# Patient Record
Sex: FEMALE | Race: WHITE | NOT HISPANIC OR LATINO | Employment: FULL TIME | RURAL
[De-identification: names, ages, dates, MRNs, and addresses within clinical notes are randomized per-mention and may not be internally consistent; named-entity substitution may affect disease eponyms.]

---

## 2017-11-07 ENCOUNTER — HISTORICAL (OUTPATIENT)
Dept: ADMINISTRATIVE | Facility: HOSPITAL | Age: 27
End: 2017-11-07

## 2017-11-10 LAB
LAB AP COMMENTS: NORMAL
LAB AP GENERAL CAT - HISTORICAL: NORMAL
LAB AP INTERPRETATION/RESULT - HISTORICAL: NEGATIVE
LAB AP SPECIMEN ADEQUACY - HISTORICAL: NORMAL
LAB AP SPECIMEN SUBMITTED - HISTORICAL: NORMAL

## 2020-06-01 ENCOUNTER — HISTORICAL (OUTPATIENT)
Dept: ADMINISTRATIVE | Facility: HOSPITAL | Age: 30
End: 2020-06-01

## 2020-06-04 LAB
REPORT: 25
REPORT: NORMAL

## 2020-07-16 ENCOUNTER — HISTORICAL (OUTPATIENT)
Dept: ADMINISTRATIVE | Facility: HOSPITAL | Age: 30
End: 2020-07-16

## 2020-07-17 LAB
25(OH)D3 SERPL-MCNC: 13.3 NG/ML
ALBUMIN SERPL BCP-MCNC: 4.1 G/DL (ref 3.5–5)
ALBUMIN/GLOB SERPL: 1.1 {RATIO}
ALP SERPL-CCNC: 76 U/L (ref 37–98)
ALT SERPL W P-5'-P-CCNC: 54 U/L (ref 13–56)
ANION GAP SERPL CALCULATED.3IONS-SCNC: 9.3 MMOL/L (ref 7–16)
AST SERPL W P-5'-P-CCNC: 35 U/L (ref 15–37)
BASOPHILS # BLD AUTO: 0.04 X10E3/UL (ref 0–0.2)
BASOPHILS NFR BLD AUTO: 0.8 % (ref 0–1)
BILIRUB SERPL-MCNC: 0.4 MG/DL (ref 0–1.2)
BUN SERPL-MCNC: 13 MG/DL (ref 7–18)
BUN/CREAT SERPL: 16
CALCIUM SERPL-MCNC: 9.2 MG/DL (ref 8.5–10.1)
CHLORIDE SERPL-SCNC: 105 MMOL/L (ref 98–107)
CHOLEST SERPL-MCNC: 185 MG/DL
CO2 SERPL-SCNC: 29 MMOL/L (ref 21–32)
CREAT SERPL-MCNC: 0.82 MG/DL (ref 0.5–1.02)
EOSINOPHIL # BLD AUTO: 0.23 X10E3/UL (ref 0–0.5)
EOSINOPHIL NFR BLD AUTO: 4.4 % (ref 1–4)
ERYTHROCYTE [DISTWIDTH] IN BLOOD BY AUTOMATED COUNT: 13 % (ref 11.5–14.5)
EST. AVERAGE GLUCOSE BLD GHB EST-MCNC: 74 MG/DL
GLOBULIN SER-MCNC: 3.6 G/DL (ref 2–4)
GLUCOSE SERPL-MCNC: 92 MG/DL (ref 74–106)
HBA1C MFR BLD HPLC: 4.8 %
HCT VFR BLD AUTO: 49.4 % (ref 38–47)
HDLC SERPL-MCNC: 40 MG/DL
HGB BLD-MCNC: 15.4 G/DL (ref 12–16)
IMM GRANULOCYTES # BLD AUTO: 0 X10E3/UL (ref 0–0.04)
IMM GRANULOCYTES NFR BLD: 0 % (ref 0–0.4)
LDLC SERPL CALC-MCNC: 111 MG/DL
LYMPHOCYTES # BLD AUTO: 1.4 X10E3/UL (ref 1–4.8)
LYMPHOCYTES NFR BLD AUTO: 27 % (ref 27–41)
MCH RBC QN AUTO: 29.6 PG (ref 27–31)
MCHC RBC AUTO-ENTMCNC: 31.2 G/DL (ref 32–36)
MCV RBC AUTO: 95 FL (ref 80–96)
MONOCYTES # BLD AUTO: 0.25 X10E3/UL (ref 0–0.8)
MONOCYTES NFR BLD AUTO: 4.8 % (ref 2–6)
MPC BLD CALC-MCNC: 13.6 FL (ref 9.4–12.4)
NEUTROPHILS # BLD AUTO: 3.27 X10E3/UL (ref 1.8–7.7)
NEUTROPHILS NFR BLD AUTO: 63 % (ref 53–65)
NONHDLC SERPL-MCNC: 145 MG/DL
NRBC # BLD AUTO: 0 X10E3/UL (ref 0–0)
NRBC, AUTO (.00): 0 /100 (ref 0–0)
PLATELET # BLD AUTO: 221 X10E3/UL (ref 150–400)
PLATELET MORPHOLOGY: ABNORMAL
POTASSIUM SERPL-SCNC: 4.3 MMOL/L (ref 3.5–5.1)
PROT SERPL-MCNC: 7.7 G/DL (ref 6.4–8.2)
RBC # BLD AUTO: 5.2 X10E6/UL (ref 4.2–5.4)
RBC MORPH BLD: NORMAL
SODIUM SERPL-SCNC: 139 MMOL/L (ref 136–145)
TRIGL SERPL-MCNC: 170 MG/DL
TSH SERPL DL<=0.005 MIU/L-ACNC: 0.93 UIU/ML (ref 0.36–3.74)
VLDLC SERPL-MCNC: 34 MG/DL
WBC # BLD AUTO: 5.19 X10E3/UL (ref 4.5–11)

## 2020-08-24 ENCOUNTER — HISTORICAL (OUTPATIENT)
Dept: ADMINISTRATIVE | Facility: HOSPITAL | Age: 30
End: 2020-08-24

## 2020-08-24 LAB — SARS-COV+SARS-COV-2 AG RESP QL IA.RAPID: NEGATIVE

## 2020-12-15 ENCOUNTER — HISTORICAL (OUTPATIENT)
Dept: ADMINISTRATIVE | Facility: HOSPITAL | Age: 30
End: 2020-12-15

## 2020-12-15 LAB — SARS-COV+SARS-COV-2 AG RESP QL IA.RAPID: NEGATIVE

## 2021-11-03 ENCOUNTER — OFFICE VISIT (OUTPATIENT)
Dept: FAMILY MEDICINE | Facility: CLINIC | Age: 31
End: 2021-11-03
Payer: COMMERCIAL

## 2021-11-03 VITALS
TEMPERATURE: 98 F | HEART RATE: 85 BPM | OXYGEN SATURATION: 98 % | DIASTOLIC BLOOD PRESSURE: 70 MMHG | RESPIRATION RATE: 20 BRPM | SYSTOLIC BLOOD PRESSURE: 116 MMHG

## 2021-11-03 DIAGNOSIS — R05.9 COUGH: ICD-10-CM

## 2021-11-03 DIAGNOSIS — R50.9 FEVER, UNSPECIFIED FEVER CAUSE: ICD-10-CM

## 2021-11-03 DIAGNOSIS — J02.9 ACUTE PHARYNGITIS, UNSPECIFIED ETIOLOGY: ICD-10-CM

## 2021-11-03 DIAGNOSIS — J06.9 VIRAL UPPER RESPIRATORY TRACT INFECTION WITH COUGH: Primary | ICD-10-CM

## 2021-11-03 LAB
CTP QC/QA: YES
CTP QC/QA: YES
FLUAV AG NPH QL: NEGATIVE
FLUBV AG NPH QL: NEGATIVE
S PYO RRNA THROAT QL PROBE: NEGATIVE
SARS-COV-2 AG RESP QL IA.RAPID: NEGATIVE

## 2021-11-03 PROCEDURE — 87428 SARSCOV & INF VIR A&B AG IA: CPT | Mod: QW,,, | Performed by: NURSE PRACTITIONER

## 2021-11-03 PROCEDURE — 87880 STREP A ASSAY W/OPTIC: CPT | Mod: QW,,, | Performed by: NURSE PRACTITIONER

## 2021-11-03 PROCEDURE — 99212 OFFICE O/P EST SF 10 MIN: CPT | Mod: 25,,, | Performed by: NURSE PRACTITIONER

## 2021-11-03 PROCEDURE — 87880 POCT RAPID STREP A: ICD-10-PCS | Mod: QW,,, | Performed by: NURSE PRACTITIONER

## 2021-11-03 PROCEDURE — 96372 THER/PROPH/DIAG INJ SC/IM: CPT | Mod: ,,, | Performed by: NURSE PRACTITIONER

## 2021-11-03 PROCEDURE — 96372 PR INJECTION,THERAP/PROPH/DIAG2ST, IM OR SUBCUT: ICD-10-PCS | Mod: ,,, | Performed by: NURSE PRACTITIONER

## 2021-11-03 PROCEDURE — 87428 POCT SARS-COV2 (COVID) WITH FLU ANTIGEN: ICD-10-PCS | Mod: QW,,, | Performed by: NURSE PRACTITIONER

## 2021-11-03 PROCEDURE — 99212 PR OFFICE/OUTPT VISIT, EST, LEVL II, 10-19 MIN: ICD-10-PCS | Mod: 25,,, | Performed by: NURSE PRACTITIONER

## 2021-11-03 RX ORDER — DEXAMETHASONE SODIUM PHOSPHATE 4 MG/ML
6 INJECTION, SOLUTION INTRA-ARTICULAR; INTRALESIONAL; INTRAMUSCULAR; INTRAVENOUS; SOFT TISSUE
Status: COMPLETED | OUTPATIENT
Start: 2021-11-03 | End: 2021-11-03

## 2021-11-03 RX ADMIN — DEXAMETHASONE SODIUM PHOSPHATE 6 MG: 4 INJECTION, SOLUTION INTRA-ARTICULAR; INTRALESIONAL; INTRAMUSCULAR; INTRAVENOUS; SOFT TISSUE at 10:11

## 2022-01-21 ENCOUNTER — OFFICE VISIT (OUTPATIENT)
Dept: FAMILY MEDICINE | Facility: CLINIC | Age: 32
End: 2022-01-21
Payer: COMMERCIAL

## 2022-01-21 VITALS
DIASTOLIC BLOOD PRESSURE: 87 MMHG | BODY MASS INDEX: 34.96 KG/M2 | TEMPERATURE: 99 F | OXYGEN SATURATION: 98 % | HEART RATE: 113 BPM | HEIGHT: 62 IN | WEIGHT: 190 LBS | SYSTOLIC BLOOD PRESSURE: 147 MMHG | RESPIRATION RATE: 18 BRPM

## 2022-01-21 DIAGNOSIS — Z20.822 EXPOSURE TO COVID-19 VIRUS: Primary | ICD-10-CM

## 2022-01-21 LAB
CTP QC/QA: YES
SARS-COV-2 AG RESP QL IA.RAPID: NEGATIVE

## 2022-01-21 PROCEDURE — 3079F DIAST BP 80-89 MM HG: CPT | Mod: ,,, | Performed by: NURSE PRACTITIONER

## 2022-01-21 PROCEDURE — 87426 SARSCOV CORONAVIRUS AG IA: CPT | Mod: QW,,, | Performed by: NURSE PRACTITIONER

## 2022-01-21 PROCEDURE — 3008F BODY MASS INDEX DOCD: CPT | Mod: ,,, | Performed by: NURSE PRACTITIONER

## 2022-01-21 PROCEDURE — 3008F PR BODY MASS INDEX (BMI) DOCUMENTED: ICD-10-PCS | Mod: ,,, | Performed by: NURSE PRACTITIONER

## 2022-01-21 PROCEDURE — 99051 PR MEDICAL SERVICES, EVE/WKEND/HOLIDAY: ICD-10-PCS | Mod: ,,, | Performed by: NURSE PRACTITIONER

## 2022-01-21 PROCEDURE — 3077F PR MOST RECENT SYSTOLIC BLOOD PRESSURE >= 140 MM HG: ICD-10-PCS | Mod: ,,, | Performed by: NURSE PRACTITIONER

## 2022-01-21 PROCEDURE — 99202 PR OFFICE/OUTPT VISIT, NEW, LEVL II, 15-29 MIN: ICD-10-PCS | Mod: ,,, | Performed by: NURSE PRACTITIONER

## 2022-01-21 PROCEDURE — 3077F SYST BP >= 140 MM HG: CPT | Mod: ,,, | Performed by: NURSE PRACTITIONER

## 2022-01-21 PROCEDURE — 99051 MED SERV EVE/WKEND/HOLIDAY: CPT | Mod: ,,, | Performed by: NURSE PRACTITIONER

## 2022-01-21 PROCEDURE — 1159F MED LIST DOCD IN RCRD: CPT | Mod: ,,, | Performed by: NURSE PRACTITIONER

## 2022-01-21 PROCEDURE — 1159F PR MEDICATION LIST DOCUMENTED IN MEDICAL RECORD: ICD-10-PCS | Mod: ,,, | Performed by: NURSE PRACTITIONER

## 2022-01-21 PROCEDURE — 3079F PR MOST RECENT DIASTOLIC BLOOD PRESSURE 80-89 MM HG: ICD-10-PCS | Mod: ,,, | Performed by: NURSE PRACTITIONER

## 2022-01-21 PROCEDURE — 99202 OFFICE O/P NEW SF 15 MIN: CPT | Mod: ,,, | Performed by: NURSE PRACTITIONER

## 2022-01-21 PROCEDURE — 87426 SARS CORONAVIRUS 2 ANTIGEN POCT: ICD-10-PCS | Mod: QW,,, | Performed by: NURSE PRACTITIONER

## 2022-01-21 NOTE — PATIENT INSTRUCTIONS
Your test was NEGATIVE for COVID-19 (coronavirus).      You may leave home and/or return to work when the following conditions are met:  · 24 hours fever free without fever-reducing medications AND  · Improved symptoms  · You are fully vaccinated or have not had close contact with someone with COVID-19 (within 6 feet for 15 minutes or more)    If you are fully vaccinated and had a close contact, there is no need for quarantine, unless you develop symptoms.      If you are not fully vaccinated and had a close contact:  · Retest at 5 to 7 days post-exposure  · If possible, it is recommended that you quarantine for 5 days from the time of contact regardless of your test status.  · A mask should be worn indoors post quarantine.    If your symptoms worsen or if you have any other concerns, please contact [facility] at [phone].     Sincerely,    LAURO Stevenson

## 2022-01-21 NOTE — PROGRESS NOTES
Subjective:       Patient ID: Naila Mitchell is a 31 y.o. female.    Chief Complaint: COVID-19 Concerns (Exposure to covid x 1 week ago; fever (99.7); Body aches; HA x today)    Exposure to Covid  Fever, BA, HA    Review of Systems   Constitutional: Positive for fever. Negative for appetite change and fatigue.   HENT: Negative for nasal congestion, ear pain and sore throat.    Eyes: Negative for pain, discharge and itching.   Respiratory: Negative for cough and shortness of breath.    Cardiovascular: Negative for chest pain and leg swelling.   Gastrointestinal: Negative for abdominal pain, change in bowel habit, nausea, vomiting and change in bowel habit.   Musculoskeletal: Negative for back pain, gait problem and neck pain.        Body aches   Integumentary:  Negative for rash and wound.   Allergic/Immunologic: Negative for immunocompromised state.   Neurological: Positive for headaches. Negative for dizziness and weakness.   All other systems reviewed and are negative.        Objective:      Physical Exam  Vitals and nursing note reviewed.   Constitutional:       General: She is not in acute distress.     Appearance: Normal appearance. She is not ill-appearing, toxic-appearing or diaphoretic.   HENT:      Head: Normocephalic.      Right Ear: Tympanic membrane, ear canal and external ear normal.      Left Ear: Tympanic membrane, ear canal and external ear normal.      Nose: Congestion present. No rhinorrhea.      Mouth/Throat:      Mouth: Mucous membranes are moist.      Pharynx: Posterior oropharyngeal erythema present. No oropharyngeal exudate.   Eyes:      General: No scleral icterus.        Right eye: No discharge.         Left eye: No discharge.      Extraocular Movements: Extraocular movements intact.      Conjunctiva/sclera: Conjunctivae normal.      Pupils: Pupils are equal, round, and reactive to light.   Cardiovascular:      Rate and Rhythm: Normal rate and regular rhythm.      Pulses: Normal pulses.       Heart sounds: Normal heart sounds. No murmur heard.      Pulmonary:      Effort: Pulmonary effort is normal. No respiratory distress.      Breath sounds: Normal breath sounds. No wheezing, rhonchi or rales.   Musculoskeletal:         General: Normal range of motion.      Cervical back: Neck supple. No tenderness.   Lymphadenopathy:      Cervical: No cervical adenopathy.   Skin:     General: Skin is warm and dry.      Capillary Refill: Capillary refill takes less than 2 seconds.      Findings: No rash.   Neurological:      Mental Status: She is alert and oriented to person, place, and time.   Psychiatric:         Mood and Affect: Mood normal.         Behavior: Behavior normal.         Thought Content: Thought content normal.         Judgment: Judgment normal.            Assessment:       1. Exposure to COVID-19 virus        Plan:   Exposure to COVID-19 virus  -     SARS Coronavirus 2 Antigen, POCT       RTC in a few days to retest for Covid

## 2022-01-21 NOTE — LETTER
January 21, 2022      [facility]  [address] [place] [address]  Phone: [phone]  Fax: [phone]       Patient: Naila Mitchell   YOB: 1990  Date of Visit: 01/21/2022    To Whom It May Concern:    Bonilla Mitchell  was at [facility] on 01/21/2022. The patient may return to work/school on 01/24/2022 with no restrictions. If you have any questions or concerns, or if I can be of further assistance, please do not hesitate to contact me.    Sincerely,    RT Adams/LAURO Christine

## 2022-01-24 ENCOUNTER — OFFICE VISIT (OUTPATIENT)
Dept: FAMILY MEDICINE | Facility: CLINIC | Age: 32
End: 2022-01-24
Payer: COMMERCIAL

## 2022-01-24 VITALS
HEART RATE: 84 BPM | WEIGHT: 190 LBS | HEIGHT: 62 IN | RESPIRATION RATE: 20 BRPM | TEMPERATURE: 99 F | BODY MASS INDEX: 34.96 KG/M2 | OXYGEN SATURATION: 98 %

## 2022-01-24 DIAGNOSIS — R05.9 COUGH: ICD-10-CM

## 2022-01-24 DIAGNOSIS — R09.81 NASAL CONGESTION: ICD-10-CM

## 2022-01-24 DIAGNOSIS — U07.1 COVID-19: ICD-10-CM

## 2022-01-24 DIAGNOSIS — Z20.822 CONTACT WITH AND (SUSPECTED) EXPOSURE TO COVID-19: Primary | ICD-10-CM

## 2022-01-24 LAB
CTP QC/QA: YES
SARS-COV-2 AG RESP QL IA.RAPID: POSITIVE

## 2022-01-24 PROCEDURE — 87426 SARS CORONAVIRUS 2 ANTIGEN POCT: ICD-10-PCS | Mod: QW,,, | Performed by: FAMILY MEDICINE

## 2022-01-24 PROCEDURE — 87426 SARSCOV CORONAVIRUS AG IA: CPT | Mod: QW,,, | Performed by: FAMILY MEDICINE

## 2022-01-24 PROCEDURE — 3008F BODY MASS INDEX DOCD: CPT | Mod: ,,, | Performed by: FAMILY MEDICINE

## 2022-01-24 PROCEDURE — 99203 PR OFFICE/OUTPT VISIT, NEW, LEVL III, 30-44 MIN: ICD-10-PCS | Mod: ,,, | Performed by: FAMILY MEDICINE

## 2022-01-24 PROCEDURE — 3008F PR BODY MASS INDEX (BMI) DOCUMENTED: ICD-10-PCS | Mod: ,,, | Performed by: FAMILY MEDICINE

## 2022-01-24 PROCEDURE — 99203 OFFICE O/P NEW LOW 30 MIN: CPT | Mod: ,,, | Performed by: FAMILY MEDICINE

## 2022-01-24 RX ORDER — FLUOXETINE HYDROCHLORIDE 20 MG/1
20 CAPSULE ORAL DAILY
COMMUNITY
Start: 2021-10-07

## 2022-01-24 RX ORDER — FLUOXETINE HYDROCHLORIDE 40 MG/1
40 CAPSULE ORAL DAILY
COMMUNITY
Start: 2021-10-07

## 2022-01-24 RX ORDER — BENZONATATE 200 MG/1
200 CAPSULE ORAL 3 TIMES DAILY PRN
Qty: 30 CAPSULE | Refills: 0 | Status: SHIPPED | OUTPATIENT
Start: 2022-01-24 | End: 2022-02-03

## 2022-01-24 RX ORDER — IPRATROPIUM BROMIDE 42 UG/1
2 SPRAY, METERED NASAL 4 TIMES DAILY
Qty: 15 ML | Refills: 0 | Status: SHIPPED | OUTPATIENT
Start: 2022-01-24 | End: 2023-10-16

## 2022-01-24 NOTE — LETTER
January 24, 2022      [facility]  [address] [facility] [place] [address]  Phone: [phone]  Fax: [phone]       Patient: Naila Mitchell   YOB: 1990  Date of Visit: 01/24/2022    To Whom It May Concern:    Bonilla Mitchell  was at [facility] on 01/24/2022. The patient may return to work/school on 01/31/2022 with no restrictions. If you have any questions or concerns, or if I can be of further assistance, please do not hesitate to contact me.    Sincerely,    Saurabh Riggs RN

## 2022-01-24 NOTE — PROGRESS NOTES
Patient ID: Naila Mitchell is a 31 y.o. female.    Chief Complaint: COVID-19 Concerns (Known covid exposure, symptomatic for 3 days, has been vaccinated but no booster yet), Fatigue, Fever, Nasal Congestion, Cough, Diarrhea, Headache, and Generalized Body Aches      HPI     31 y.o. patient who presents to [facility] for COVID-19 testing.     Symptoms: fatigue, fever, congestion, cough, weakness, HA  Onset: 3 days  Known COVID-19 exposure: yes  Employment: [employer]  Last shift: Friday  Tobacco use: Yes, describe: cigarettes  PMH: none  COVID-19 Vaccine taken: yes     Review of Systems  Review of Systems   Constitutional: positive for fatigue and positive for fever.   HENT: positive for nasal congestion. negative for sore throat.    Respiratory: positivefor cough and negative for shortness of breath.    Cardiovascular: negative for chest pain.   Gastrointestinal: negative for abdominal pain, negative for constipation, negative for diarrhea, negative for nausea and negative for vomiting.   Genitourinary: negative for dysuria.   Integumentary:  negative for rash.   Neurological: negative for dizziness, positive for weakness and positive for headaches.   All other systems reviewed and are negative.      Objective:      Physical Exam  Physical Exam    Complete Physical Examination not completed due to patient in vehicle and maintaining universal precautions.  Constitutional:       General: Patient is awake. Patient is not in acute distress.     Appearance: Normal appearance. Patient is well-developed.   HENT:      Head: Normocephalic and atraumatic.      Nose: Nose normal. No congestion or rhinorrhea.   Eyes:      General: No scleral icterus.     Extraocular Movements: Extraocular movements intact.      Conjunctiva/sclera: Conjunctivae normal.      Pupils: Pupils are equal, round, and reactive to light.   Pulmonary:      Effort: Pulmonary effort is normal. No respiratory distress.    Musculoskeletal:      Cervical back: Normal range of motion and neck supple.   Skin:     Coloration: Skin is not cyanotic, jaundiced, mottled or pale.   Neurological:      General: No focal deficit present.      Mental Status: Patient is alert and oriented to person, place, and time.   Psychiatric:         Attention and Perception: Attention and perception normal.         Mood and Affect: Mood and affect normal.         Speech: Speech normal.         Behavior: Behavior normal. Behavior is cooperative.         Thought Content: Thought content normal.         Cognition and Memory: Cognition and memory normal.         Judgment: Judgment normal.           Assessment:       1. Contact with and (suspected) exposure to covid-19    2. COVID-19    3. Nasal congestion    4. Cough        Plan:       COVID-19 TESTING  -- patient with symptoms  -- patient with known exposure to COVID-19  -- COVID-19 rapid result positive; pt notified of results  -- patient instructions for proper home monitoring and isolation procedures discussed in detail.  -- patient instructed to notify clinic of any changes, report to ED for evaluation of any new or worsening chest pain, shortness of breath, or other severe symptoms  -- will follow patient with telemedicine as appropriate    Recommended quarantine/isolation: 5 days from sx onset; medications as prescribed; vit c and zinc; increase po fluid intake. RTW note for 1/31/22 and copy of results provided.

## 2022-03-21 ENCOUNTER — OFFICE VISIT (OUTPATIENT)
Dept: FAMILY MEDICINE | Facility: CLINIC | Age: 32
End: 2022-03-21
Payer: COMMERCIAL

## 2022-03-21 VITALS
TEMPERATURE: 103 F | OXYGEN SATURATION: 98 % | SYSTOLIC BLOOD PRESSURE: 158 MMHG | HEART RATE: 119 BPM | BODY MASS INDEX: 37.73 KG/M2 | DIASTOLIC BLOOD PRESSURE: 95 MMHG | RESPIRATION RATE: 20 BRPM | WEIGHT: 205 LBS | HEIGHT: 62 IN

## 2022-03-21 DIAGNOSIS — J10.1 INFLUENZA A: ICD-10-CM

## 2022-03-21 DIAGNOSIS — R50.9 FEVER, UNSPECIFIED FEVER CAUSE: ICD-10-CM

## 2022-03-21 DIAGNOSIS — Z20.828 CONTACT WITH OR EXPOSURE TO VIRAL DISEASE: Primary | ICD-10-CM

## 2022-03-21 LAB
CTP QC/QA: YES
FLUAV AG NPH QL: POSITIVE
FLUBV AG NPH QL: NEGATIVE
SARS-COV-2 AG RESP QL IA.RAPID: NEGATIVE

## 2022-03-21 PROCEDURE — 87428 POCT SARS-COV2 (COVID) WITH FLU ANTIGEN: ICD-10-PCS | Mod: QW,,, | Performed by: FAMILY MEDICINE

## 2022-03-21 PROCEDURE — 99203 PR OFFICE/OUTPT VISIT, NEW, LEVL III, 30-44 MIN: ICD-10-PCS | Mod: ,,, | Performed by: FAMILY MEDICINE

## 2022-03-21 PROCEDURE — 99203 OFFICE O/P NEW LOW 30 MIN: CPT | Mod: ,,, | Performed by: FAMILY MEDICINE

## 2022-03-21 PROCEDURE — 87428 SARSCOV & INF VIR A&B AG IA: CPT | Mod: QW,,, | Performed by: FAMILY MEDICINE

## 2022-03-21 RX ORDER — OSELTAMIVIR PHOSPHATE 75 MG/1
75 CAPSULE ORAL 2 TIMES DAILY
Qty: 10 CAPSULE | Refills: 0 | Status: SHIPPED | OUTPATIENT
Start: 2022-03-21 | End: 2022-03-26

## 2022-03-21 RX ORDER — ACETAMINOPHEN 500 MG
1000 TABLET ORAL
Status: COMPLETED | OUTPATIENT
Start: 2022-03-21 | End: 2022-03-21

## 2022-03-21 RX ORDER — FLUOXETINE HYDROCHLORIDE 40 MG/1
40 CAPSULE ORAL DAILY
COMMUNITY
Start: 2021-10-07 | End: 2023-10-16

## 2022-03-21 RX ADMIN — Medication 1000 MG: at 03:03

## 2022-03-21 NOTE — LETTER
March 21, 2022      [facility]  [address] [place] [address] [place] [address]  Phone: [phone]  Fax: [phone]       Patient: Naila Galeas   YOB: 1990  Date of Visit: 03/21/2022    To Whom It May Concern:    Bonilla Galeas  was at [facility] on 03/21/2022. The patient may return to work/school on 03/28/2022 with no restrictions. If you have any questions or concerns, or if I can be of further assistance, please do not hesitate to contact me.    Sincerely,    Dr. Darrell Rodriguez

## 2022-03-21 NOTE — PROGRESS NOTES
Subjective:       Patient ID: Naila Galeas is a 32 y.o. female.    Chief Complaint: Fever (Body aches) and Cough    Sudden onset fever body ache cough and mild diarrhea today.    Review of Systems      Objective:      Physical Exam  Constitutional:       General: She is not in acute distress.     Appearance: She is ill-appearing.   HENT:      Mouth/Throat:      Pharynx: No posterior oropharyngeal erythema.   Cardiovascular:      Rate and Rhythm: Normal rate and regular rhythm.   Pulmonary:      Effort: Pulmonary effort is normal.      Breath sounds: Normal breath sounds.   Abdominal:      Tenderness: There is no abdominal tenderness.   Skin:     Findings: No lesion or rash.   Neurological:      Mental Status: She is alert.         Assessment:       Problem List Items Addressed This Visit    None     Visit Diagnoses     Contact with or exposure to viral disease    -  Primary    Relevant Orders    POCT SARS-COV2 (COVID) with Flu Antigen    Influenza A        Fever, unspecified fever cause        Relevant Medications    acetaminophen tablet 1,000 mg (Start on 3/21/2022  3:30 PM)          Plan:         Tamiflu

## 2022-11-15 ENCOUNTER — OFFICE VISIT (OUTPATIENT)
Dept: FAMILY MEDICINE | Facility: CLINIC | Age: 32
End: 2022-11-15
Payer: COMMERCIAL

## 2022-11-15 VITALS
DIASTOLIC BLOOD PRESSURE: 72 MMHG | WEIGHT: 205 LBS | HEIGHT: 62 IN | HEART RATE: 77 BPM | TEMPERATURE: 98 F | BODY MASS INDEX: 37.73 KG/M2 | SYSTOLIC BLOOD PRESSURE: 103 MMHG

## 2022-11-15 DIAGNOSIS — R10.9 ABDOMINAL PAIN, UNSPECIFIED ABDOMINAL LOCATION: Primary | ICD-10-CM

## 2022-11-15 DIAGNOSIS — R30.0 DYSURIA: ICD-10-CM

## 2022-11-15 DIAGNOSIS — R10.9 RIGHT FLANK PAIN: ICD-10-CM

## 2022-11-15 LAB
B-HCG UR QL: NEGATIVE
BILIRUB SERPL-MCNC: NORMAL MG/DL
BLOOD URINE, POC: NORMAL
COLOR, POC UA: YELLOW
CTP QC/QA: YES
GLUCOSE UR QL STRIP: NORMAL
KETONES UR QL STRIP: NORMAL
LEUKOCYTE ESTERASE URINE, POC: NORMAL
NITRITE, POC UA: NORMAL
PH, POC UA: 8.5
PROTEIN, POC: NORMAL
SPECIFIC GRAVITY, POC UA: 1.02
UROBILINOGEN, POC UA: 0.2

## 2022-11-15 PROCEDURE — 1159F PR MEDICATION LIST DOCUMENTED IN MEDICAL RECORD: ICD-10-PCS | Mod: ,,, | Performed by: NURSE PRACTITIONER

## 2022-11-15 PROCEDURE — 85025 CBC WITH DIFFERENTIAL: ICD-10-PCS | Mod: ,,, | Performed by: CLINICAL MEDICAL LABORATORY

## 2022-11-15 PROCEDURE — 81025 URINE PREGNANCY TEST: CPT | Mod: QW,,, | Performed by: NURSE PRACTITIONER

## 2022-11-15 PROCEDURE — 3008F BODY MASS INDEX DOCD: CPT | Mod: ,,, | Performed by: NURSE PRACTITIONER

## 2022-11-15 PROCEDURE — 3078F DIAST BP <80 MM HG: CPT | Mod: ,,, | Performed by: NURSE PRACTITIONER

## 2022-11-15 PROCEDURE — 80053 COMPREHEN METABOLIC PANEL: CPT | Mod: ,,, | Performed by: CLINICAL MEDICAL LABORATORY

## 2022-11-15 PROCEDURE — 87086 CULTURE, URINE: ICD-10-PCS | Mod: ,,, | Performed by: CLINICAL MEDICAL LABORATORY

## 2022-11-15 PROCEDURE — 99213 OFFICE O/P EST LOW 20 MIN: CPT | Mod: ,,, | Performed by: NURSE PRACTITIONER

## 2022-11-15 PROCEDURE — 81003 URINALYSIS AUTO W/O SCOPE: CPT | Mod: QW,,, | Performed by: NURSE PRACTITIONER

## 2022-11-15 PROCEDURE — 85025 COMPLETE CBC W/AUTO DIFF WBC: CPT | Mod: ,,, | Performed by: CLINICAL MEDICAL LABORATORY

## 2022-11-15 PROCEDURE — 80053 COMPREHENSIVE METABOLIC PANEL: ICD-10-PCS | Mod: ,,, | Performed by: CLINICAL MEDICAL LABORATORY

## 2022-11-15 PROCEDURE — 3078F PR MOST RECENT DIASTOLIC BLOOD PRESSURE < 80 MM HG: ICD-10-PCS | Mod: ,,, | Performed by: NURSE PRACTITIONER

## 2022-11-15 PROCEDURE — 3074F PR MOST RECENT SYSTOLIC BLOOD PRESSURE < 130 MM HG: ICD-10-PCS | Mod: ,,, | Performed by: NURSE PRACTITIONER

## 2022-11-15 PROCEDURE — 87086 URINE CULTURE/COLONY COUNT: CPT | Mod: ,,, | Performed by: CLINICAL MEDICAL LABORATORY

## 2022-11-15 PROCEDURE — 81025 POCT URINE PREGNANCY: ICD-10-PCS | Mod: QW,,, | Performed by: NURSE PRACTITIONER

## 2022-11-15 PROCEDURE — 3008F PR BODY MASS INDEX (BMI) DOCUMENTED: ICD-10-PCS | Mod: ,,, | Performed by: NURSE PRACTITIONER

## 2022-11-15 PROCEDURE — 3074F SYST BP LT 130 MM HG: CPT | Mod: ,,, | Performed by: NURSE PRACTITIONER

## 2022-11-15 PROCEDURE — 99213 PR OFFICE/OUTPT VISIT, EST, LEVL III, 20-29 MIN: ICD-10-PCS | Mod: ,,, | Performed by: NURSE PRACTITIONER

## 2022-11-15 PROCEDURE — 81003 POCT URINALYSIS W/O SCOPE: ICD-10-PCS | Mod: QW,,, | Performed by: NURSE PRACTITIONER

## 2022-11-15 PROCEDURE — 1159F MED LIST DOCD IN RCRD: CPT | Mod: ,,, | Performed by: NURSE PRACTITIONER

## 2022-11-16 LAB
ALBUMIN SERPL BCP-MCNC: 4.1 G/DL (ref 3.5–5)
ALBUMIN/GLOB SERPL: 1.1 {RATIO}
ALP SERPL-CCNC: 90 U/L (ref 37–98)
ALT SERPL W P-5'-P-CCNC: 40 U/L (ref 13–56)
ANION GAP SERPL CALCULATED.3IONS-SCNC: 11 MMOL/L (ref 7–16)
AST SERPL W P-5'-P-CCNC: 24 U/L (ref 15–37)
BASOPHILS # BLD AUTO: 0.04 K/UL (ref 0–0.2)
BASOPHILS NFR BLD AUTO: 0.7 % (ref 0–1)
BILIRUB SERPL-MCNC: 0.4 MG/DL (ref ?–1.2)
BUN SERPL-MCNC: 12 MG/DL (ref 7–18)
BUN/CREAT SERPL: 15 (ref 6–20)
CALCIUM SERPL-MCNC: 9.4 MG/DL (ref 8.5–10.1)
CHLORIDE SERPL-SCNC: 105 MMOL/L (ref 98–107)
CO2 SERPL-SCNC: 27 MMOL/L (ref 21–32)
CREAT SERPL-MCNC: 0.81 MG/DL (ref 0.55–1.02)
DIFFERENTIAL METHOD BLD: ABNORMAL
EGFR (NO RACE VARIABLE) (RUSH/TITUS): 100 ML/MIN/1.73M²
EOSINOPHIL # BLD AUTO: 0.23 K/UL (ref 0–0.5)
EOSINOPHIL NFR BLD AUTO: 4.3 % (ref 1–4)
ERYTHROCYTE [DISTWIDTH] IN BLOOD BY AUTOMATED COUNT: 13 % (ref 11.5–14.5)
GLOBULIN SER-MCNC: 3.6 G/DL (ref 2–4)
GLUCOSE SERPL-MCNC: 87 MG/DL (ref 74–106)
HCT VFR BLD AUTO: 44.7 % (ref 38–47)
HGB BLD-MCNC: 14.3 G/DL (ref 12–16)
IMM GRANULOCYTES # BLD AUTO: 0.03 K/UL (ref 0–0.04)
IMM GRANULOCYTES NFR BLD: 0.6 % (ref 0–0.4)
LYMPHOCYTES # BLD AUTO: 1.42 K/UL (ref 1–4.8)
LYMPHOCYTES NFR BLD AUTO: 26.6 % (ref 27–41)
MCH RBC QN AUTO: 29.5 PG (ref 27–31)
MCHC RBC AUTO-ENTMCNC: 32 G/DL (ref 32–36)
MCV RBC AUTO: 92.4 FL (ref 80–96)
MONOCYTES # BLD AUTO: 0.3 K/UL (ref 0–0.8)
MONOCYTES NFR BLD AUTO: 5.6 % (ref 2–6)
MPC BLD CALC-MCNC: 13.5 FL (ref 9.4–12.4)
NEUTROPHILS # BLD AUTO: 3.32 K/UL (ref 1.8–7.7)
NEUTROPHILS NFR BLD AUTO: 62.2 % (ref 53–65)
NRBC # BLD AUTO: 0 X10E3/UL
NRBC, AUTO (.00): 0 %
PLATELET # BLD AUTO: 241 K/UL (ref 150–400)
PLATELET MORPHOLOGY: ABNORMAL
POTASSIUM SERPL-SCNC: 3.9 MMOL/L (ref 3.5–5.1)
PROT SERPL-MCNC: 7.7 G/DL (ref 6.4–8.2)
RBC # BLD AUTO: 4.84 M/UL (ref 4.2–5.4)
RBC MORPH BLD: NORMAL
SODIUM SERPL-SCNC: 139 MMOL/L (ref 136–145)
WBC # BLD AUTO: 5.34 K/UL (ref 4.5–11)

## 2022-11-18 ENCOUNTER — HOSPITAL ENCOUNTER (OUTPATIENT)
Dept: RADIOLOGY | Facility: HOSPITAL | Age: 32
Discharge: HOME | End: 2022-11-18
Attending: NURSE PRACTITIONER
Payer: COMMERCIAL

## 2022-11-18 DIAGNOSIS — R10.9 ABDOMINAL PAIN, UNSPECIFIED ABDOMINAL LOCATION: ICD-10-CM

## 2022-11-18 LAB — UA COMPLETE W REFLEX CULTURE PNL UR: NORMAL

## 2022-11-18 PROCEDURE — 74178 CT ABD&PLV WO CNTR FLWD CNTR: CPT | Mod: TC

## 2022-11-18 PROCEDURE — 25500020 PHARM REV CODE 255: Performed by: NURSE PRACTITIONER

## 2022-11-18 RX ADMIN — IOPAMIDOL 100 ML: 755 INJECTION, SOLUTION INTRAVENOUS at 10:11

## 2023-10-02 ENCOUNTER — HOSPITAL ENCOUNTER (EMERGENCY)
Facility: HOSPITAL | Age: 33
Discharge: HOME | End: 2023-10-02
Payer: COMMERCIAL

## 2023-10-02 VITALS
RESPIRATION RATE: 19 BRPM | SYSTOLIC BLOOD PRESSURE: 126 MMHG | HEART RATE: 82 BPM | TEMPERATURE: 98 F | OXYGEN SATURATION: 100 % | BODY MASS INDEX: 37.49 KG/M2 | DIASTOLIC BLOOD PRESSURE: 73 MMHG | HEIGHT: 62 IN

## 2023-10-02 DIAGNOSIS — S89.90XA KNEE INJURY: ICD-10-CM

## 2023-10-02 DIAGNOSIS — S99.929A FOOT INJURY: ICD-10-CM

## 2023-10-02 DIAGNOSIS — S99.919A ANKLE INJURY: ICD-10-CM

## 2023-10-02 PROCEDURE — 99283 EMERGENCY DEPT VISIT LOW MDM: CPT

## 2023-10-02 PROCEDURE — 99284 PR EMERGENCY DEPT VISIT,LEVEL IV: ICD-10-PCS | Mod: ,,, | Performed by: NURSE PRACTITIONER

## 2023-10-02 PROCEDURE — 99284 EMERGENCY DEPT VISIT MOD MDM: CPT | Mod: ,,, | Performed by: NURSE PRACTITIONER

## 2023-10-02 NOTE — DISCHARGE INSTRUCTIONS
Use crutches.  Ice.  Elevate.  Rest. Follow up with your primary care provider in 2 days. Return to the emergency department for any increase in symptoms or for any other new or worrisome symptoms.

## 2023-10-02 NOTE — ED TRIAGE NOTES
Pt presents to ED with c/o fall down approx 3 steps, pt reports trip and fall. Pt reports bilateral ankle pain, right worse than left. Pt reports right knee pain. Pt denies LOC.

## 2023-10-02 NOTE — ED PROVIDER NOTES
Encounter Date: 10/2/2023       History     Chief Complaint   Patient presents with    Fall     33-year-old female presents to the emergency department to be evaluated after she tripped and fell down some stairs.  She complains of right ankle pain, right foot pain, and right knee pain.  Denies head injury, headache, neck pain, back pain, loss of consciousness.    The history is provided by the patient.   Fall  The accident occurred just prior to arrival. Pertinent negatives include no neck pain, no back pain, no paresthesias, no paralysis, no visual change, no fever, no numbness, no abdominal pain, no bowel incontinence, no nausea, no vomiting, no hematuria, no headaches, no hearing loss, no loss of consciousness and no tingling.     Review of patient's allergies indicates:  No Known Allergies  Past Medical History:   Diagnosis Date    Anxiety      No past surgical history on file.  No family history on file.  Social History     Tobacco Use    Smoking status: Every Day    Smokeless tobacco: Never   Substance Use Topics    Alcohol use: Never    Drug use: Never     Review of Systems   Constitutional:  Negative for fever.   Gastrointestinal:  Negative for abdominal pain, bowel incontinence, nausea and vomiting.   Genitourinary:  Negative for hematuria.   Musculoskeletal:  Negative for back pain and neck pain.   Neurological:  Negative for tingling, loss of consciousness, numbness, headaches and paresthesias.   All other systems reviewed and are negative.      Physical Exam     Initial Vitals [10/02/23 0916]   BP Pulse Resp Temp SpO2   126/73 82 19 98 °F (36.7 °C) 100 %      MAP       --         Physical Exam    Vitals reviewed.  Constitutional: She appears well-developed and well-nourished.   HENT:   Head: Normocephalic and atraumatic.   Eyes: EOM are normal. Pupils are equal, round, and reactive to light.   Neck: Neck supple.   Cardiovascular:  Normal rate and regular rhythm.           Pulmonary/Chest: Breath sounds  normal.   Abdominal: Abdomen is soft. Bowel sounds are normal. She exhibits no distension and no mass. There is no abdominal tenderness. There is no rebound and no guarding.   Musculoskeletal:         General: Normal range of motion.      Cervical back: Normal and neck supple.      Thoracic back: Normal.      Lumbar back: Normal.      Right hip: Normal.      Left hip: Normal.      Right upper leg: Normal.      Left upper leg: Normal.      Right knee: No swelling, deformity, effusion, erythema, ecchymosis or lacerations. Normal range of motion. Tenderness present.      Left knee: Normal.      Right lower leg: Normal.      Left lower leg: Normal.      Right ankle: No swelling, deformity, ecchymosis or lacerations. Tenderness present. Normal range of motion.      Left ankle: No swelling, deformity, ecchymosis or lacerations. Tenderness present. Normal range of motion. Anterior drawer test negative. Normal pulse.      Right foot: Normal range of motion and normal capillary refill. Tenderness present. No swelling, deformity, bunion, Charcot foot, foot drop, prominent metatarsal heads, laceration or crepitus. Normal pulse.      Left foot: Normal.     Neurological: She is alert and oriented to person, place, and time. She has normal strength. GCS score is 15. GCS eye subscore is 4. GCS verbal subscore is 5. GCS motor subscore is 6.   Skin: Skin is warm and dry. Capillary refill takes less than 2 seconds.   Psychiatric: She has a normal mood and affect.         Medical Screening Exam   See Full Note    ED Course   Procedures  Labs Reviewed - No data to display       Imaging Results              X-Ray Ankle Complete Bilateral (Final result)  Result time 10/02/23 10:27:58      Final result by Germán Ruth DO (10/02/23 10:27:58)                   Impression:      As above.    Point of Service: [facility]      Electronically signed by: Germán Ruth  Date:    10/02/2023  Time:    10:27               Narrative:     EXAMINATION:  XR ANKLE COMPLETE 3 VIEW BILATERAL    CLINICAL HISTORY:  Unspecified injury of unspecified ankle, initial encounter    COMPARISON:  None    TECHNIQUE:  Frontal, lateral, and oblique views of the bilateral ankle.    FINDINGS:  Chronic appearing ossicle along the tip of the lateral malleolus on the right.  No convincing acute fracture or dislocation demonstrated. No concerning radiopaque foreign body visualized.                                       X-Ray Foot Complete Right (Final result)  Result time 10/02/23 10:30:25      Final result by Germán Ruth DO (10/02/23 10:30:25)                   Impression:      As above.    Point of Service: [facility]      Electronically signed by: Germán Ruth  Date:    10/02/2023  Time:    10:30               Narrative:    EXAMINATION:  XR FOOT COMPLETE 3 VIEW RIGHT    CLINICAL HISTORY:  Unspecified injury of unspecified foot, initial encounter    COMPARISON:  None    TECHNIQUE:  Frontal, lateral, and oblique views of the right foot.    FINDINGS:  No convincing acute fracture or dislocation demonstrated. No concerning radiopaque foreign body visualized.                                       X-Ray Knee 1 or 2 View Right (Final result)  Result time 10/02/23 10:29:05      Final result by Germán Ruth DO (10/02/23 10:29:05)                   Impression:      As above.    Point of Service: [facility]      Electronically signed by: Germán Ruth  Date:    10/02/2023  Time:    10:29               Narrative:    EXAMINATION:  XR KNEE 1 OR 2 VIEW RIGHT    CLINICAL HISTORY:  Unspecified injury of unspecified lower leg, initial encounter    COMPARISON:  None    TECHNIQUE:  Frontal and lateral views of the right knee.    FINDINGS:  No convincing acute fracture or dislocation demonstrated. No concerning radiopaque foreign body visualized.                                       Medications - No data to display  Medical Decision Making  33-year-old  female presents to the emergency department to be evaluated after she tripped and fell down some stairs.  She complains of right ankle pain, right foot pain, and right knee pain.  Denies head injury, headache, neck pain, back pain, loss of consciousness.  X-rays ordered, films reviewed as well as radiologist's interpretation that was significant for no acute process  Diagnosis: Foot injury, ankle injury, knee injury  Crutches given to the patient in the ED    Amount and/or Complexity of Data Reviewed  Radiology: ordered.                               Clinical Impression:   Final diagnoses:  [S99.919A] Ankle injury  [S99.929A] Foot injury  [S89.90XA] Knee injury        ED Disposition Condition    Discharge Stable          ED Prescriptions    None       Follow-up Information    None          Dayami Smith, LAURO  10/02/23 1036

## 2023-10-03 ENCOUNTER — TELEPHONE (OUTPATIENT)
Dept: EMERGENCY MEDICINE | Facility: HOSPITAL | Age: 33
End: 2023-10-03
Payer: COMMERCIAL

## 2023-10-16 ENCOUNTER — OFFICE VISIT (OUTPATIENT)
Dept: FAMILY MEDICINE | Facility: CLINIC | Age: 33
End: 2023-10-16
Payer: COMMERCIAL

## 2023-10-16 VITALS
OXYGEN SATURATION: 97 % | HEIGHT: 63 IN | DIASTOLIC BLOOD PRESSURE: 80 MMHG | WEIGHT: 209.38 LBS | HEART RATE: 85 BPM | RESPIRATION RATE: 18 BRPM | SYSTOLIC BLOOD PRESSURE: 116 MMHG | TEMPERATURE: 98 F | BODY MASS INDEX: 37.1 KG/M2
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
## 2023-10-16 DIAGNOSIS — M25.572 ACUTE LEFT ANKLE PAIN: ICD-10-CM

## 2023-10-16 DIAGNOSIS — M25.571 ACUTE RIGHT ANKLE PAIN: Primary | ICD-10-CM

## 2023-10-16 PROCEDURE — 1159F MED LIST DOCD IN RCRD: CPT | Mod: ,,, | Performed by: NURSE PRACTITIONER

## 2023-10-16 PROCEDURE — 1159F PR MEDICATION LIST DOCUMENTED IN MEDICAL RECORD: ICD-10-PCS | Mod: ,,, | Performed by: NURSE PRACTITIONER

## 2023-10-16 PROCEDURE — 99213 PR OFFICE/OUTPT VISIT, EST, LEVL III, 20-29 MIN: ICD-10-PCS | Mod: ,,, | Performed by: NURSE PRACTITIONER

## 2023-10-16 PROCEDURE — 1160F PR REVIEW ALL MEDS BY PRESCRIBER/CLIN PHARMACIST DOCUMENTED: ICD-10-PCS | Mod: ,,, | Performed by: NURSE PRACTITIONER

## 2023-10-16 PROCEDURE — 1160F RVW MEDS BY RX/DR IN RCRD: CPT | Mod: ,,, | Performed by: NURSE PRACTITIONER

## 2023-10-16 PROCEDURE — 3079F PR MOST RECENT DIASTOLIC BLOOD PRESSURE 80-89 MM HG: ICD-10-PCS | Mod: ,,, | Performed by: NURSE PRACTITIONER

## 2023-10-16 PROCEDURE — 3079F DIAST BP 80-89 MM HG: CPT | Mod: ,,, | Performed by: NURSE PRACTITIONER

## 2023-10-16 PROCEDURE — 99213 OFFICE O/P EST LOW 20 MIN: CPT | Mod: ,,, | Performed by: NURSE PRACTITIONER

## 2023-10-16 PROCEDURE — 3008F PR BODY MASS INDEX (BMI) DOCUMENTED: ICD-10-PCS | Mod: ,,, | Performed by: NURSE PRACTITIONER

## 2023-10-16 PROCEDURE — 3074F PR MOST RECENT SYSTOLIC BLOOD PRESSURE < 130 MM HG: ICD-10-PCS | Mod: ,,, | Performed by: NURSE PRACTITIONER

## 2023-10-16 PROCEDURE — 3008F BODY MASS INDEX DOCD: CPT | Mod: ,,, | Performed by: NURSE PRACTITIONER

## 2023-10-16 PROCEDURE — 3074F SYST BP LT 130 MM HG: CPT | Mod: ,,, | Performed by: NURSE PRACTITIONER

## 2023-10-16 RX ORDER — DICLOFENAC SODIUM 75 MG/1
75 TABLET, DELAYED RELEASE ORAL 2 TIMES DAILY PRN
Qty: 30 TABLET | Refills: 2 | Status: SHIPPED | OUTPATIENT
Start: 2023-10-16

## 2023-10-16 RX ORDER — METFORMIN HYDROCHLORIDE 500 MG/1
500 TABLET ORAL
Status: ON HOLD | COMMUNITY
Start: 2023-06-21 | End: 2023-12-01

## 2023-10-16 NOTE — PROGRESS NOTES
"Subjective:       Patient ID: Naila Galeas is a 33 y.o. female.    Chief Complaint: Ankle Pain (Bilateral. Ongoing 2 weeks. States she sprained both about 2 weeks prior.)    Presents to clinic as above. Fell off of her porch 2 weeks ago and injured both ankles. Painful to bear weight. Went to ER on day of injury and had negative xrays. Wants referral to ortho.       Review of Systems   Constitutional: Negative.    Respiratory: Negative.     Cardiovascular: Negative.    Musculoskeletal:  Positive for falls and joint pain.          Reviewed family, medical, surgical, and social history.    Objective:      /80 (BP Location: Right arm, Patient Position: Sitting, BP Method: Large (Manual))   Pulse 85   Temp 98.3 °F (36.8 °C) (Oral)   Resp 18   Ht 5' 3" (1.6 m)   Wt 95 kg (209 lb 6.4 oz)   LMP 10/13/2023   SpO2 97%   BMI 37.09 kg/m²   Physical Exam  Vitals and nursing note reviewed.   Constitutional:       General: She is not in acute distress.     Appearance: Normal appearance. She is not ill-appearing, toxic-appearing or diaphoretic.   HENT:      Head: Normocephalic.      Mouth/Throat:      Mouth: Mucous membranes are moist.   Cardiovascular:      Rate and Rhythm: Normal rate and regular rhythm.      Heart sounds: Normal heart sounds.   Pulmonary:      Effort: Pulmonary effort is normal.      Breath sounds: Normal breath sounds.   Musculoskeletal:      Cervical back: Normal range of motion and neck supple.      Right ankle: Swelling present. No ecchymosis. Tenderness present over the lateral malleolus. Decreased range of motion.      Left ankle: Swelling and ecchymosis present. Tenderness present over the lateral malleolus. Decreased range of motion.      Comments: Pain, swelling and bruising to both lateral ankles. Normal pulses and cap refill. Normal sensation.    Skin:     General: Skin is warm and dry.      Capillary Refill: Capillary refill takes less than 2 seconds.   Neurological:      Mental " Status: She is alert and oriented to person, place, and time.   Psychiatric:         Mood and Affect: Mood normal.         Behavior: Behavior normal.         Thought Content: Thought content normal.         Judgment: Judgment normal.            No visits with results within 1 Day(s) from this visit.   Latest known visit with results is:   Office Visit on 11/15/2022   Component Date Value Ref Range Status    Color, UA 11/15/2022 Yellow   Final    Spec Grav UA 11/15/2022 1.025   Final    pH, UA 11/15/2022 8.5   Final    WBC, UA 11/15/2022 neg   Final    Nitrite, UA 11/15/2022 neg   Final    Protein, POC 11/15/2022 trace   Final    Glucose, UA 11/15/2022 neg   Final    Ketones, UA 11/15/2022 neg   Final    Bilirubin, POC 11/15/2022 neg   Final    Urobilinogen, UA 11/15/2022 0.2   Final    Blood, UA 11/15/2022 neg   Final    POC Preg Test, Ur 11/15/2022 Negative  Negative Final     Acceptable 11/15/2022 Yes   Final    Culture, Urine 11/15/2022 Mixed Skin/Urogenital Yodit Isolated, no further workup.   Final    Sodium 11/15/2022 139  136 - 145 mmol/L Final    Potassium 11/15/2022 3.9  3.5 - 5.1 mmol/L Final    Chloride 11/15/2022 105  98 - 107 mmol/L Final    CO2 11/15/2022 27  21 - 32 mmol/L Final    Anion Gap 11/15/2022 11  7 - 16 mmol/L Final    Glucose 11/15/2022 87  74 - 106 mg/dL Final    BUN 11/15/2022 12  7 - 18 mg/dL Final    Creatinine 11/15/2022 0.81  0.55 - 1.02 mg/dL Final    BUN/Creatinine Ratio 11/15/2022 15  6 - 20 Final    Calcium 11/15/2022 9.4  8.5 - 10.1 mg/dL Final    Total Protein 11/15/2022 7.7  6.4 - 8.2 g/dL Final    Albumin 11/15/2022 4.1  3.5 - 5.0 g/dL Final    Globulin 11/15/2022 3.6  2.0 - 4.0 g/dL Final    A/G Ratio 11/15/2022 1.1   Final    Bilirubin, Total 11/15/2022 0.4  >0.0 - 1.2 mg/dL Final    Alk Phos 11/15/2022 90  37 - 98 U/L Final    ALT 11/15/2022 40  13 - 56 U/L Final    AST 11/15/2022 24  15 - 37 U/L Final    eGFR 11/15/2022 100  >=60 mL/min/1.73m² Final    WBC  11/15/2022 5.34  4.50 - 11.00 K/uL Final    RBC 11/15/2022 4.84  4.20 - 5.40 M/uL Final    Hemoglobin 11/15/2022 14.3  12.0 - 16.0 g/dL Final    Hematocrit 11/15/2022 44.7  38.0 - 47.0 % Final    MCV 11/15/2022 92.4  80.0 - 96.0 fL Final    MCH 11/15/2022 29.5  27.0 - 31.0 pg Final    MCHC 11/15/2022 32.0  32.0 - 36.0 g/dL Final    RDW 11/15/2022 13.0  11.5 - 14.5 % Final    Platelet Count 11/15/2022 241  150 - 400 K/uL Final    MPV 11/15/2022 13.5 (H)  9.4 - 12.4 fL Final    Neutrophils % 11/15/2022 62.2  53.0 - 65.0 % Final    Lymphocytes % 11/15/2022 26.6 (L)  27.0 - 41.0 % Final    Monocytes % 11/15/2022 5.6  2.0 - 6.0 % Final    Eosinophils % 11/15/2022 4.3 (H)  1.0 - 4.0 % Final    Basophils % 11/15/2022 0.7  0.0 - 1.0 % Final    Immature Granulocytes % 11/15/2022 0.6 (H)  0.0 - 0.4 % Final    nRBC, Auto 11/15/2022 0.0  <=0.0 % Final    Neutrophils, Abs 11/15/2022 3.32  1.80 - 7.70 K/uL Final    Lymphocytes, Absolute 11/15/2022 1.42  1.00 - 4.80 K/uL Final    Monocytes, Absolute 11/15/2022 0.30  0.00 - 0.80 K/uL Final    Eosinophils, Absolute 11/15/2022 0.23  0.00 - 0.50 K/uL Final    Basophils, Absolute 11/15/2022 0.04  0.00 - 0.20 K/uL Final    Immature Granulocytes, Absolute 11/15/2022 0.03  0.00 - 0.04 K/uL Final    nRBC, Absolute 11/15/2022 0.00  <=0.00 x10e3/uL Final    Diff Type 11/15/2022 Scan Smear   Final    Platelet Morphology 11/15/2022 Few Large Platelets (A)  Normal Final    RBC Morphology 11/15/2022 Normal   Final      Assessment:       1. Acute right ankle pain    2. Acute left ankle pain    3. Fall, initial encounter        Plan:       Acute right ankle pain  -     Cancel: X-Ray Ankle Complete 3 View Right; Future; Expected date: 10/16/2023  -     Ambulatory referral/consult to Orthopedics; Future; Expected date: 10/23/2023  -     diclofenac (VOLTAREN) 75 MG EC tablet; Take 1 tablet (75 mg total) by mouth 2 (two) times daily as needed (ankle pain).  Dispense: 30 tablet; Refill: 2    Acute left  ankle pain  -     Cancel: X-Ray Ankle Complete 3 View Left; Future; Expected date: 10/16/2023  -     Ambulatory referral/consult to Orthopedics; Future; Expected date: 10/23/2023  -     diclofenac (VOLTAREN) 75 MG EC tablet; Take 1 tablet (75 mg total) by mouth 2 (two) times daily as needed (ankle pain).  Dispense: 30 tablet; Refill: 2    Fall, initial encounter  -     Cancel: X-Ray Ankle Complete 3 View Right; Future; Expected date: 10/16/2023  -     Cancel: X-Ray Ankle Complete 3 View Left; Future; Expected date: 10/16/2023  -     Ambulatory referral/consult to Orthopedics; Future; Expected date: 10/23/2023  -     diclofenac (VOLTAREN) 75 MG EC tablet; Take 1 tablet (75 mg total) by mouth 2 (two) times daily as needed (ankle pain).  Dispense: 30 tablet; Refill: 2    F/U with us in 1 week regarding referral if you have not gotten an appointment  RTC PRN          Risks, benefits, and side effects were discussed with the patient. All questions were answered to the fullest satisfaction of the patient, and pt verbalized understanding and agreement to treatment plan. Pt was to call with any new or worsening symptoms, or present to the ER.

## 2023-10-26 ENCOUNTER — OFFICE VISIT (OUTPATIENT)
Dept: ORTHOPEDICS | Facility: CLINIC | Age: 33
End: 2023-10-26
Payer: COMMERCIAL

## 2023-10-26 DIAGNOSIS — M25.579 PAIN IN JOINT INVOLVING ANKLE AND FOOT, UNSPECIFIED LATERALITY: Primary | ICD-10-CM

## 2023-10-26 DIAGNOSIS — M25.571 ACUTE RIGHT ANKLE PAIN: ICD-10-CM
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
## 2023-10-26 DIAGNOSIS — M25.572 ACUTE LEFT ANKLE PAIN: ICD-10-CM

## 2023-10-26 PROCEDURE — 99212 OFFICE O/P EST SF 10 MIN: CPT | Mod: PBBFAC | Performed by: NURSE PRACTITIONER

## 2023-10-26 PROCEDURE — 99203 OFFICE O/P NEW LOW 30 MIN: CPT | Mod: S$GLB,,, | Performed by: NURSE PRACTITIONER

## 2023-10-26 PROCEDURE — 99203 PR OFFICE/OUTPT VISIT, NEW, LEVL III, 30-44 MIN: ICD-10-PCS | Mod: S$GLB,,, | Performed by: NURSE PRACTITIONER

## 2023-10-26 NOTE — PROGRESS NOTES
HPI:   Naila Galeas is a pleasant 33 y.o. patient who reports to clinic for evaluation of bilateral ankle pain, right being the worst. Left seems to be improving. Patient had a fall almost 3 weeks ago, rolling both ankle. Was seen in the ED and told she had bilateral sprains.She later went to immediate care..   She has tried a walking but the right ankle is . Hurts to run or jump  Injury onset and description: fall  Patient's occupation:   This is not a work related injury.   This injury has been non-responsive to conservative care. The pain is worse with repetitive use, and strenuous activity is very difficult.  her pain improves with rest.    PAST MEDICAL HISTORY:   Past Medical History:   Diagnosis Date    Anxiety      PAST SURGICAL HISTORY:   No past surgical history on file.  MEDICATIONS:    Current Outpatient Medications:     diclofenac (VOLTAREN) 75 MG EC tablet, Take 1 tablet (75 mg total) by mouth 2 (two) times daily as needed (ankle pain)., Disp: 30 tablet, Rfl: 2    FLUoxetine 20 MG capsule, Take 20 mg by mouth once daily., Disp: , Rfl:     FLUoxetine 40 MG capsule, Take 40 mg by mouth once daily., Disp: , Rfl:     metFORMIN (GLUCOPHAGE) 500 MG tablet, Take 500 mg by mouth., Disp: , Rfl:   ALLERGIES:   Review of patient's allergies indicates:  No Known Allergies      PHYSICAL EXAM:  VITAL SIGNS: LMP 10/13/2023   General: Well-developed well-nourished 33 y.o. femalein no acute distress;Cardiovascular: Regular rhythm by palpation of distal pulse, normal color and temperature, no concerning varicosities on symptomatic side Lungs: No labored breathing or wheezing appreciated Neuro: Alert and oriented ×3 Psychiatric: well oriented to person, place and time, demonstrates normal mood and affect Skin: No rashes, lesions or ulcers, normal temperature, turgor, and texture on uninvolved extremity    General    Constitutional: She is oriented to person, place, and time. She appears well-nourished.    HENT:   Head: Normocephalic and atraumatic.   Eyes: Pupils are equal, round, and reactive to light.   Neck: Neck supple.   Cardiovascular:  Normal rate and regular rhythm.            Pulmonary/Chest: Effort normal. No respiratory distress.   Abdominal: There is no abdominal tenderness. There is no guarding.   Neurological: She is alert and oriented to person, place, and time. She has normal reflexes.   Psychiatric: She has a normal mood and affect. Her behavior is normal. Judgment and thought content normal.         Right Ankle/Foot Exam     Inspection   Erythema: absent  Bruising: Ankle - absent Foot - absent  Effusion: Ankle - absent Foot - absent    Tenderness   The patient is tender to palpation of the lateral malleolus and peroneals.    Pain   The patient exhibits pain of the peroneals.    Range of Motion   Ankle Joint   Dorsiflexion:  normal   Plantar flexion:  normal   Subtalar Joint   Inversion:  abnormal   Eversion:  abnormal     Other   Sensation: normal    Left Ankle/Foot Exam     Inspection  Erythema: absent  Bruising: Ankle - absent Foot - absent  Effusion: Ankle - absent Foot - absent    Pain   The patient exhibits pain of the peroneals.    Tenderness   The patient is tender to palpation of the peroneals.    Range of Motion   Ankle Joint  Dorsiflexion:  normal   Plantar flexion:  normal     Subtalar Joint   Inversion:  normal   Eversion:  normal     Other   Sensation: normal      Vascular Exam     Right Pulses  Dorsalis Pedis:      2+          Left Pulses  Dorsalis Pedis:      2+            IMAGING:  X-Ray Foot Complete Right    Result Date: 10/2/2023  EXAMINATION: XR FOOT COMPLETE 3 VIEW RIGHT CLINICAL HISTORY: Unspecified injury of unspecified foot, initial encounter COMPARISON: None TECHNIQUE: Frontal, lateral, and oblique views of the right foot. FINDINGS: No convincing acute fracture or dislocation demonstrated. No concerning radiopaque foreign body visualized.     As above. Point of Service:  [facility] Electronically signed by: Germán Ruth Date:    10/02/2023 Time:    10:30    X-Ray Knee 1 or 2 View Right    Result Date: 10/2/2023  EXAMINATION: XR KNEE 1 OR 2 VIEW RIGHT CLINICAL HISTORY: Unspecified injury of unspecified lower leg, initial encounter COMPARISON: None TECHNIQUE: Frontal and lateral views of the right knee. FINDINGS: No convincing acute fracture or dislocation demonstrated. No concerning radiopaque foreign body visualized.     As above. Point of Service: [facility] Electronically signed by: Germán Ruth Date:    10/02/2023 Time:    10:29    X-Ray Ankle Complete Bilateral    Result Date: 10/2/2023  EXAMINATION: XR ANKLE COMPLETE 3 VIEW BILATERAL CLINICAL HISTORY: Unspecified injury of unspecified ankle, initial encounter COMPARISON: None TECHNIQUE: Frontal, lateral, and oblique views of the bilateral ankle. FINDINGS: Chronic appearing ossicle along the tip of the lateral malleolus on the right.  No convincing acute fracture or dislocation demonstrated. No concerning radiopaque foreign body visualized.     As above. Point of Service: [facility] Electronically signed by: Germán Ruth Date:    10/02/2023 Time:    10:27        ASSESSMENT:      ICD-10-CM ICD-9-CM   1. Pain in joint involving ankle and foot, unspecified laterality  M25.579 719.47   2. Acute right ankle pain  M25.571 719.47     338.19   3. Acute left ankle pain  M25.572 719.47   4. Fall, initial encounter  W19.XXXA E888.9       PLAN:     -Findings and treatment options were discussed with the patient  -All questions answered  MRI right ankle  Will call with results and refer to Dr Pang     There are no Patient Instructions on file for this visit.  Orders Placed This Encounter   Procedures    MRI Ankle Without Contrast Right     Standing Status:   Future     Standing Expiration Date:   10/26/2024     Order Specific Question:   Does the patient have or ever had a pacemaker or a  defibrillator (Note: Some facilities may not be able to schedule an MRI for patients with pacemakers and defibrillators. You should contact your local radiology dept to determine if this is the case.)?     Answer:   No     Order Specific Question:   Does the patient have an aneurysm or surgical clip, pump, nerve/brain stimulator, middle/inner ear prosthesis, or other metal implant or foreign object (bullet, shrapnel)? If they have a card related to their implant, ask them to bring it. Issues related to the implant may cause the MRI to be delayed.     Answer:   No     Order Specific Question:   Will the patient require sedation?     Answer:   No     Order Specific Question:   Is the patient pregnant?     Answer:   No     Order Specific Question:   May the Radiologist modify the order per protocol to meet the clinical needs of the patient?     Answer:   Yes     Order Specific Question:   Recist criteria?     Answer:   No     Order Specific Question:   Will this service be billed to a Worker's Comp policy?     Answer:   No     Order Specific Question:   Does the patient have on a skin patch for medication with aluminized backing?     Answer:   No     Order Specific Question:   Is this part of a Research Study?     Answer:   No     Procedures

## 2023-11-10 ENCOUNTER — TELEPHONE (OUTPATIENT)
Dept: ORTHOPEDICS | Facility: CLINIC | Age: 33
End: 2023-11-10
Payer: COMMERCIAL

## 2023-11-10 NOTE — TELEPHONE ENCOUNTER
SPOKE TO PATIENT ABOUT RESULTS     CALLED PATIENT WITH MRI RESULT; SHE IS ALREADY SCHEDULE TO FOLLOW-UP WITH DR. GILBERT  PATIENT NEEDS TO BE NON-WEIGHT BEARING.     ----- Message from LAURO Rivera sent at 11/10/2023  8:18 AM CST -----  She needs to be nwb

## 2023-11-16 ENCOUNTER — TELEPHONE (OUTPATIENT)
Dept: ORTHOPEDICS | Facility: CLINIC | Age: 33
End: 2023-11-16
Payer: COMMERCIAL

## 2023-11-16 ENCOUNTER — OFFICE VISIT (OUTPATIENT)
Dept: ORTHOPEDICS | Facility: CLINIC | Age: 33
End: 2023-11-16
Payer: COMMERCIAL

## 2023-11-16 DIAGNOSIS — S86.301A INJURY OF PERONEAL TENDON OF RIGHT FOOT, INITIAL ENCOUNTER: ICD-10-CM

## 2023-11-16 DIAGNOSIS — S92.214A CLOSED NONDISPLACED FRACTURE OF CUBOID OF RIGHT FOOT, INITIAL ENCOUNTER: Primary | ICD-10-CM

## 2023-11-16 PROCEDURE — 99204 OFFICE O/P NEW MOD 45 MIN: CPT | Mod: S$GLB,,, | Performed by: ORTHOPAEDIC SURGERY

## 2023-11-16 PROCEDURE — 99213 OFFICE O/P EST LOW 20 MIN: CPT | Mod: PBBFAC | Performed by: ORTHOPAEDIC SURGERY

## 2023-11-16 PROCEDURE — 1159F MED LIST DOCD IN RCRD: CPT | Mod: S$GLB,,, | Performed by: ORTHOPAEDIC SURGERY

## 2023-11-16 PROCEDURE — 99204 PR OFFICE/OUTPT VISIT, NEW, LEVL IV, 45-59 MIN: ICD-10-PCS | Mod: S$GLB,,, | Performed by: ORTHOPAEDIC SURGERY

## 2023-11-16 PROCEDURE — 1159F PR MEDICATION LIST DOCUMENTED IN MEDICAL RECORD: ICD-10-PCS | Mod: S$GLB,,, | Performed by: ORTHOPAEDIC SURGERY

## 2023-11-16 NOTE — TELEPHONE ENCOUNTER
----- Message from Ria Mcleod sent at 11/16/2023  4:35 PM CST -----  Pt calling to reschedule surgery to the following week - pt call back  # [phone]

## 2023-11-16 NOTE — PROGRESS NOTES
33 y.o. Female returns to clinic for a follow up visit regarding     ICD-10-CM ICD-9-CM   1. Closed nondisplaced fracture of cuboid of right foot, initial encounter  S92.214A 825.23   2. Injury of peroneal tendon of right foot, initial encounter  S86.301A 959.7        Patient saw Yen a few weeks ago. An MRI was ordered. She is here to review the results of her MRI and discuss treatment options moving forward.        Past Medical History:   Diagnosis Date    Anxiety      History reviewed. No pertinent surgical history.      PHYSICAL EXAMINATION:            Left Ankle/Foot Exam     Inspection  Erythema: present  Bruising: Ankle - present   Effusion: Ankle - present   Atrophy: Ankle - absent     Pain   The patient exhibits pain of the lateral malleolus and medial malleolus.    Range of Motion   Ankle Joint  Dorsiflexion:  abnormal   Plantar flexion:  abnormal   Pan Test:  normal  First MTP Joint: normal    Alignment   Knee Alignment: neutral    Tests   Anterior drawer: positive  Varus tilt: negative    Other   Foot Crepitus:  absent  Ankle Crepitus: absent  Sensation: normal  Peroneal Subluxation: negative      Muscle Strength   Left Lower Extremity   Anterior tibial:  4/5   Posterior tibial:  4/5   Gastrocsoleus:  4/5     Marked tenderness along the peroneal tendons.  No significant tenderness over the cuboid however    IMAGING:  MRI Ankle Without Contrast Right    Result Date: 11/8/2023  EXAMINATION: MRI ANKLE WITHOUT CONTRAST RIGHT CLINICAL HISTORY: Pain in unspecified ankle and joints of unspecified footAnkle pain, chronic, osteoarthritis suspected; COMPARISON: bilateral ankle x-ray October 2 2023 TECHNIQUE: Magnetic resonance imaging of the right ankle was performed without the use of intravenous contrast. FINDINGS: Osseous structures/cartilage: There is a none displaced fracture involving the ventral periarticular cuboid with moderate associated edema.  This may be stress related or related to sequela  of trauma.  No osteochondral defects along the talar dome are demonstrated. Ligaments: Medial collateral ligaments are grossly unremarkable.  Anterior and posterior talofibular, anterior and posterior tibiofibular, and calcaneofibular ligaments are grossly unremarkable. Tendons: Achilles tendon is grossly unremarkable.  Flexor and extensor tendons are grossly unremarkable.  Peroneal tendons are grossly unremarkable. Plantar fascia: Grossly unremarkable. Joint fluid: There is no significant ankle or subtalar joint effusion. Sinus tarsi: Normal fat signal is demonstrated in the sinus tarsi. Soft tissues: Surrounding soft tissues are grossly unremarkable.     There is a none displaced fracture involving the ventral periarticular cuboid with moderate associated edema. This may be stress related or related to sequela of trauma. Point of Service: [facility] Electronically signed by: Germán Ruth Date:    11/08/2023 Time:    16:19     Per my read I see evidence of flattening of the peroneus brevis and tearing in this area consistent with peroneal tendon tear  ASSESSMENT:      ICD-10-CM ICD-9-CM   1. Closed nondisplaced fracture of cuboid of right foot, initial encounter  S92.214A 825.23   2. Injury of peroneal tendon of right foot, initial encounter  S86.301A 959.7       PLAN:     -Findings and treatment options were discussed with the patient  -All questions answered      Per my read she has a peroneus brevis tear.  This tear will likely require surgical intervention.  Will plan for open repair of the peroneus brevis tendon rupture.  Risks benefits alternatives were discussed.  Degree of convalescence following surgery was also discussed.  Patient voiced her understanding.    There are no Patient Instructions on file for this visit.      No orders of the defined types were placed in this encounter.        Procedures

## 2023-11-16 NOTE — H&P (VIEW-ONLY)
33 y.o. Female returns to clinic for a follow up visit regarding     ICD-10-CM ICD-9-CM   1. Closed nondisplaced fracture of cuboid of right foot, initial encounter  S92.214A 825.23   2. Injury of peroneal tendon of right foot, initial encounter  S86.301A 959.7        Patient saw Yen a few weeks ago. An MRI was ordered. She is here to review the results of her MRI and discuss treatment options moving forward.        Past Medical History:   Diagnosis Date    Anxiety      History reviewed. No pertinent surgical history.      PHYSICAL EXAMINATION:            Left Ankle/Foot Exam     Inspection  Erythema: present  Bruising: Ankle - present   Effusion: Ankle - present   Atrophy: Ankle - absent     Pain   The patient exhibits pain of the lateral malleolus and medial malleolus.    Range of Motion   Ankle Joint  Dorsiflexion:  abnormal   Plantar flexion:  abnormal   Pan Test:  normal  First MTP Joint: normal    Alignment   Knee Alignment: neutral    Tests   Anterior drawer: positive  Varus tilt: negative    Other   Foot Crepitus:  absent  Ankle Crepitus: absent  Sensation: normal  Peroneal Subluxation: negative      Muscle Strength   Left Lower Extremity   Anterior tibial:  4/5   Posterior tibial:  4/5   Gastrocsoleus:  4/5     Marked tenderness along the peroneal tendons.  No significant tenderness over the cuboid however    IMAGING:  MRI Ankle Without Contrast Right    Result Date: 11/8/2023  EXAMINATION: MRI ANKLE WITHOUT CONTRAST RIGHT CLINICAL HISTORY: Pain in unspecified ankle and joints of unspecified footAnkle pain, chronic, osteoarthritis suspected; COMPARISON: bilateral ankle x-ray October 2 2023 TECHNIQUE: Magnetic resonance imaging of the right ankle was performed without the use of intravenous contrast. FINDINGS: Osseous structures/cartilage: There is a none displaced fracture involving the ventral periarticular cuboid with moderate associated edema.  This may be stress related or related to sequela  of trauma.  No osteochondral defects along the talar dome are demonstrated. Ligaments: Medial collateral ligaments are grossly unremarkable.  Anterior and posterior talofibular, anterior and posterior tibiofibular, and calcaneofibular ligaments are grossly unremarkable. Tendons: Achilles tendon is grossly unremarkable.  Flexor and extensor tendons are grossly unremarkable.  Peroneal tendons are grossly unremarkable. Plantar fascia: Grossly unremarkable. Joint fluid: There is no significant ankle or subtalar joint effusion. Sinus tarsi: Normal fat signal is demonstrated in the sinus tarsi. Soft tissues: Surrounding soft tissues are grossly unremarkable.     There is a none displaced fracture involving the ventral periarticular cuboid with moderate associated edema. This may be stress related or related to sequela of trauma. Point of Service: [facility] Electronically signed by: Germán Ruth Date:    11/08/2023 Time:    16:19     Per my read I see evidence of flattening of the peroneus brevis and tearing in this area consistent with peroneal tendon tear  ASSESSMENT:      ICD-10-CM ICD-9-CM   1. Closed nondisplaced fracture of cuboid of right foot, initial encounter  S92.214A 825.23   2. Injury of peroneal tendon of right foot, initial encounter  S86.301A 959.7       PLAN:     -Findings and treatment options were discussed with the patient  -All questions answered      Per my read she has a peroneus brevis tear.  This tear will likely require surgical intervention.  Will plan for open repair of the peroneus brevis tendon rupture.  Risks benefits alternatives were discussed.  Degree of convalescence following surgery was also discussed.  Patient voiced her understanding.    There are no Patient Instructions on file for this visit.      No orders of the defined types were placed in this encounter.        Procedures

## 2023-11-17 DIAGNOSIS — Z01.818 PREPROCEDURAL EXAMINATION: Primary | ICD-10-CM

## 2023-11-17 DIAGNOSIS — M25.579 PAIN IN JOINT INVOLVING ANKLE AND FOOT, UNSPECIFIED LATERALITY: Primary | ICD-10-CM

## 2023-11-17 DIAGNOSIS — M25.571 RIGHT ANKLE PAIN, UNSPECIFIED CHRONICITY: ICD-10-CM

## 2023-11-17 RX ORDER — SODIUM CHLORIDE 9 MG/ML
INJECTION, SOLUTION INTRAVENOUS CONTINUOUS
Status: CANCELLED | OUTPATIENT
Start: 2023-11-17

## 2023-11-17 RX ORDER — MUPIROCIN 20 MG/G
OINTMENT TOPICAL
Status: CANCELLED | OUTPATIENT
Start: 2023-11-17

## 2023-11-29 DIAGNOSIS — Z01.818 PREPROCEDURAL EXAMINATION: Primary | ICD-10-CM

## 2023-12-01 ENCOUNTER — ANESTHESIA (OUTPATIENT)
Dept: SURGERY | Facility: HOSPITAL | Age: 33
End: 2023-12-01
Payer: COMMERCIAL

## 2023-12-01 ENCOUNTER — HOSPITAL ENCOUNTER (OUTPATIENT)
Facility: HOSPITAL | Age: 33
Discharge: HOME | End: 2023-12-01
Attending: ORTHOPAEDIC SURGERY
Payer: COMMERCIAL

## 2023-12-01 ENCOUNTER — ANESTHESIA EVENT (OUTPATIENT)
Dept: SURGERY | Facility: HOSPITAL | Age: 33
End: 2023-12-01
Payer: COMMERCIAL

## 2023-12-01 VITALS
OXYGEN SATURATION: 96 % | HEIGHT: 62 IN | RESPIRATION RATE: 16 BRPM | DIASTOLIC BLOOD PRESSURE: 73 MMHG | HEART RATE: 83 BPM | BODY MASS INDEX: 37.73 KG/M2 | SYSTOLIC BLOOD PRESSURE: 125 MMHG | WEIGHT: 205 LBS | TEMPERATURE: 98 F

## 2023-12-01 DIAGNOSIS — M25.579 PAIN IN JOINT INVOLVING ANKLE AND FOOT, UNSPECIFIED LATERALITY: ICD-10-CM

## 2023-12-01 DIAGNOSIS — M25.571 RIGHT ANKLE PAIN, UNSPECIFIED CHRONICITY: Primary | ICD-10-CM

## 2023-12-01 LAB
B-HCG UR QL: NEGATIVE
CTP QC/QA: YES

## 2023-12-01 PROCEDURE — 37000009 HC ANESTHESIA EA ADD 15 MINS: Performed by: ORTHOPAEDIC SURGERY

## 2023-12-01 PROCEDURE — 27658 REPAIR OF LEG TENDON EACH: CPT | Mod: RT,,, | Performed by: ORTHOPAEDIC SURGERY

## 2023-12-01 PROCEDURE — 36000708 HC OR TIME LEV III 1ST 15 MIN: Performed by: ORTHOPAEDIC SURGERY

## 2023-12-01 PROCEDURE — D9220A PRA ANESTHESIA: Mod: CRNA,,, | Performed by: NURSE ANESTHETIST, CERTIFIED REGISTERED

## 2023-12-01 PROCEDURE — 27201423 OPTIME MED/SURG SUP & DEVICES STERILE SUPPLY: Performed by: ORTHOPAEDIC SURGERY

## 2023-12-01 PROCEDURE — D9220A PRA ANESTHESIA: ICD-10-PCS | Mod: ANES,,, | Performed by: ANESTHESIOLOGY

## 2023-12-01 PROCEDURE — 36000709 HC OR TIME LEV III EA ADD 15 MIN: Performed by: ORTHOPAEDIC SURGERY

## 2023-12-01 PROCEDURE — 37000008 HC ANESTHESIA 1ST 15 MINUTES: Performed by: ORTHOPAEDIC SURGERY

## 2023-12-01 PROCEDURE — 63600175 PHARM REV CODE 636 W HCPCS: Performed by: ANESTHESIOLOGY

## 2023-12-01 PROCEDURE — 25000003 PHARM REV CODE 250: Performed by: ORTHOPAEDIC SURGERY

## 2023-12-01 PROCEDURE — 97161 PT EVAL LOW COMPLEX 20 MIN: CPT

## 2023-12-01 PROCEDURE — 71000033 HC RECOVERY, INTIAL HOUR: Performed by: ORTHOPAEDIC SURGERY

## 2023-12-01 PROCEDURE — 71000015 HC POSTOP RECOV 1ST HR: Performed by: ORTHOPAEDIC SURGERY

## 2023-12-01 PROCEDURE — 27658 PR REPAIR FLEX LEG TENDON,PRIM,EA: ICD-10-PCS | Mod: RT,,, | Performed by: ORTHOPAEDIC SURGERY

## 2023-12-01 PROCEDURE — D9220A PRA ANESTHESIA: ICD-10-PCS | Mod: CRNA,,, | Performed by: NURSE ANESTHETIST, CERTIFIED REGISTERED

## 2023-12-01 PROCEDURE — 25000003 PHARM REV CODE 250: Performed by: NURSE ANESTHETIST, CERTIFIED REGISTERED

## 2023-12-01 PROCEDURE — 81025 URINE PREGNANCY TEST: CPT | Performed by: ORTHOPAEDIC SURGERY

## 2023-12-01 PROCEDURE — 25000003 PHARM REV CODE 250: Performed by: ANESTHESIOLOGY

## 2023-12-01 PROCEDURE — D9220A PRA ANESTHESIA: Mod: ANES,,, | Performed by: ANESTHESIOLOGY

## 2023-12-01 PROCEDURE — 63600175 PHARM REV CODE 636 W HCPCS: Performed by: NURSE ANESTHETIST, CERTIFIED REGISTERED

## 2023-12-01 RX ORDER — SODIUM CHLORIDE 9 MG/ML
INJECTION, SOLUTION INTRAVENOUS CONTINUOUS
Status: DISCONTINUED | OUTPATIENT
Start: 2023-12-01 | End: 2023-12-01 | Stop reason: HOSPADM

## 2023-12-01 RX ORDER — DIMENHYDRINATE 50 MG
50 TABLET ORAL ONCE
Status: COMPLETED | OUTPATIENT
Start: 2023-12-01 | End: 2023-12-01

## 2023-12-01 RX ORDER — MEPERIDINE HYDROCHLORIDE 25 MG/ML
INJECTION INTRAMUSCULAR; INTRAVENOUS; SUBCUTANEOUS
Status: DISCONTINUED | OUTPATIENT
Start: 2023-12-01 | End: 2023-12-01

## 2023-12-01 RX ORDER — DEXAMETHASONE SODIUM PHOSPHATE 4 MG/ML
INJECTION, SOLUTION INTRA-ARTICULAR; INTRALESIONAL; INTRAMUSCULAR; INTRAVENOUS; SOFT TISSUE
Status: DISCONTINUED | OUTPATIENT
Start: 2023-12-01 | End: 2023-12-01

## 2023-12-01 RX ORDER — MIDAZOLAM HYDROCHLORIDE 1 MG/ML
INJECTION INTRAMUSCULAR; INTRAVENOUS
Status: DISCONTINUED | OUTPATIENT
Start: 2023-12-01 | End: 2023-12-01

## 2023-12-01 RX ORDER — ONDANSETRON 2 MG/ML
INJECTION INTRAMUSCULAR; INTRAVENOUS
Status: DISCONTINUED | OUTPATIENT
Start: 2023-12-01 | End: 2023-12-01

## 2023-12-01 RX ORDER — ONDANSETRON 4 MG/1
4 TABLET, ORALLY DISINTEGRATING ORAL EVERY 6 HOURS PRN
Qty: 30 TABLET | Refills: 0 | Status: SHIPPED | OUTPATIENT
Start: 2023-12-01

## 2023-12-01 RX ORDER — FENTANYL CITRATE 50 UG/ML
INJECTION, SOLUTION INTRAMUSCULAR; INTRAVENOUS
Status: DISCONTINUED | OUTPATIENT
Start: 2023-12-01 | End: 2023-12-01

## 2023-12-01 RX ORDER — ONDANSETRON 4 MG/1
8 TABLET, ORALLY DISINTEGRATING ORAL EVERY 8 HOURS PRN
Status: DISCONTINUED | OUTPATIENT
Start: 2023-12-01 | End: 2023-12-01 | Stop reason: HOSPADM

## 2023-12-01 RX ORDER — DIPHENHYDRAMINE HYDROCHLORIDE 50 MG/ML
25 INJECTION INTRAMUSCULAR; INTRAVENOUS EVERY 6 HOURS PRN
Status: DISCONTINUED | OUTPATIENT
Start: 2023-12-01 | End: 2023-12-01 | Stop reason: HOSPADM

## 2023-12-01 RX ORDER — IPRATROPIUM BROMIDE AND ALBUTEROL SULFATE 2.5; .5 MG/3ML; MG/3ML
3 SOLUTION RESPIRATORY (INHALATION) ONCE
Status: DISCONTINUED | OUTPATIENT
Start: 2023-12-01 | End: 2023-12-01 | Stop reason: HOSPADM

## 2023-12-01 RX ORDER — OXYCODONE AND ACETAMINOPHEN 10; 325 MG/1; MG/1
1 TABLET ORAL EVERY 6 HOURS PRN
Qty: 30 TABLET | Refills: 0 | Status: SHIPPED | OUTPATIENT
Start: 2023-12-01

## 2023-12-01 RX ORDER — MUPIROCIN 20 MG/G
OINTMENT TOPICAL
Status: DISCONTINUED | OUTPATIENT
Start: 2023-12-01 | End: 2023-12-01 | Stop reason: HOSPADM

## 2023-12-01 RX ORDER — SODIUM CHLORIDE, SODIUM LACTATE, POTASSIUM CHLORIDE, CALCIUM CHLORIDE 600; 310; 30; 20 MG/100ML; MG/100ML; MG/100ML; MG/100ML
INJECTION, SOLUTION INTRAVENOUS CONTINUOUS PRN
Status: DISCONTINUED | OUTPATIENT
Start: 2023-12-01 | End: 2023-12-01

## 2023-12-01 RX ORDER — OXYCODONE HYDROCHLORIDE 5 MG/1
10 TABLET ORAL EVERY 4 HOURS PRN
Status: DISCONTINUED | OUTPATIENT
Start: 2023-12-01 | End: 2023-12-01 | Stop reason: HOSPADM

## 2023-12-01 RX ORDER — CEFAZOLIN SODIUM 1 G/3ML
INJECTION, POWDER, FOR SOLUTION INTRAMUSCULAR; INTRAVENOUS
Status: DISCONTINUED | OUTPATIENT
Start: 2023-12-01 | End: 2023-12-01

## 2023-12-01 RX ORDER — HYDROMORPHONE HYDROCHLORIDE 2 MG/ML
0.5 INJECTION, SOLUTION INTRAMUSCULAR; INTRAVENOUS; SUBCUTANEOUS EVERY 5 MIN PRN
Status: DISCONTINUED | OUTPATIENT
Start: 2023-12-01 | End: 2023-12-01 | Stop reason: HOSPADM

## 2023-12-01 RX ORDER — MORPHINE SULFATE 10 MG/ML
4 INJECTION INTRAMUSCULAR; INTRAVENOUS; SUBCUTANEOUS EVERY 5 MIN PRN
Status: DISCONTINUED | OUTPATIENT
Start: 2023-12-01 | End: 2023-12-01 | Stop reason: HOSPADM

## 2023-12-01 RX ORDER — DIAZEPAM 5 MG/1
10 TABLET ORAL ONCE
Status: COMPLETED | OUTPATIENT
Start: 2023-12-01 | End: 2023-12-01

## 2023-12-01 RX ORDER — ONDANSETRON 2 MG/ML
4 INJECTION INTRAMUSCULAR; INTRAVENOUS DAILY PRN
Status: DISCONTINUED | OUTPATIENT
Start: 2023-12-01 | End: 2023-12-01 | Stop reason: HOSPADM

## 2023-12-01 RX ORDER — MEPERIDINE HYDROCHLORIDE 25 MG/ML
25 INJECTION INTRAMUSCULAR; INTRAVENOUS; SUBCUTANEOUS EVERY 10 MIN PRN
Status: DISCONTINUED | OUTPATIENT
Start: 2023-12-01 | End: 2023-12-01 | Stop reason: HOSPADM

## 2023-12-01 RX ORDER — PROPOFOL 10 MG/ML
VIAL (ML) INTRAVENOUS
Status: DISCONTINUED | OUTPATIENT
Start: 2023-12-01 | End: 2023-12-01

## 2023-12-01 RX ORDER — KETOROLAC TROMETHAMINE 30 MG/ML
30 INJECTION, SOLUTION INTRAMUSCULAR; INTRAVENOUS ONCE
Status: COMPLETED | OUTPATIENT
Start: 2023-12-01 | End: 2023-12-01

## 2023-12-01 RX ORDER — ONDANSETRON 4 MG/1
4 TABLET, FILM COATED ORAL ONCE
Status: COMPLETED | OUTPATIENT
Start: 2023-12-01 | End: 2023-12-01

## 2023-12-01 RX ORDER — LIDOCAINE HYDROCHLORIDE 20 MG/ML
INJECTION, SOLUTION EPIDURAL; INFILTRATION; INTRACAUDAL; PERINEURAL
Status: DISCONTINUED | OUTPATIENT
Start: 2023-12-01 | End: 2023-12-01

## 2023-12-01 RX ORDER — OXYCODONE AND ACETAMINOPHEN 5; 325 MG/1; MG/1
1 TABLET ORAL EVERY 4 HOURS PRN
Status: DISCONTINUED | OUTPATIENT
Start: 2023-12-01 | End: 2023-12-01 | Stop reason: HOSPADM

## 2023-12-01 RX ORDER — CELECOXIB 200 MG/1
200 CAPSULE ORAL 2 TIMES DAILY
Qty: 60 CAPSULE | Refills: 2 | Status: SHIPPED | OUTPATIENT
Start: 2023-12-01

## 2023-12-01 RX ADMIN — CEFAZOLIN 2 G: 1 INJECTION, POWDER, FOR SOLUTION INTRAMUSCULAR; INTRAVENOUS; PARENTERAL at 07:12

## 2023-12-01 RX ADMIN — LIDOCAINE HYDROCHLORIDE 80 MG: 20 INJECTION, SOLUTION INTRAVENOUS at 07:12

## 2023-12-01 RX ADMIN — KETOROLAC TROMETHAMINE 30 MG: 30 INJECTION INTRAMUSCULAR; INTRAVENOUS at 10:12

## 2023-12-01 RX ADMIN — MIDAZOLAM 2 MG: 1 INJECTION INTRAMUSCULAR; INTRAVENOUS at 07:12

## 2023-12-01 RX ADMIN — FENTANYL CITRATE 100 MCG: 50 INJECTION INTRAMUSCULAR; INTRAVENOUS at 07:12

## 2023-12-01 RX ADMIN — DEXAMETHASONE SODIUM PHOSPHATE 8 MG: 4 INJECTION, SOLUTION INTRA-ARTICULAR; INTRALESIONAL; INTRAMUSCULAR; INTRAVENOUS; SOFT TISSUE at 07:12

## 2023-12-01 RX ADMIN — PROPOFOL 200 MG: 10 INJECTION, EMULSION INTRAVENOUS at 07:12

## 2023-12-01 RX ADMIN — MEPERIDINE HYDROCHLORIDE 25 MG: 25 INJECTION INTRAMUSCULAR; INTRAVENOUS; SUBCUTANEOUS at 09:12

## 2023-12-01 RX ADMIN — SODIUM CHLORIDE, POTASSIUM CHLORIDE, SODIUM LACTATE AND CALCIUM CHLORIDE: 600; 310; 30; 20 INJECTION, SOLUTION INTRAVENOUS at 07:12

## 2023-12-01 RX ADMIN — ONDANSETRON 4 MG: 2 INJECTION INTRAMUSCULAR; INTRAVENOUS at 07:12

## 2023-12-01 RX ADMIN — SODIUM CHLORIDE: 9 INJECTION, SOLUTION INTRAVENOUS at 06:12

## 2023-12-01 RX ADMIN — DIMENHYDRINATE 50 MG: 50 TABLET ORAL at 06:12

## 2023-12-01 RX ADMIN — ONDANSETRON HYDROCHLORIDE 4 MG: 4 TABLET, FILM COATED ORAL at 06:12

## 2023-12-01 RX ADMIN — DIAZEPAM 10 MG: 5 TABLET ORAL at 06:12

## 2023-12-01 NOTE — OP NOTE
[facility] - Orthopedic Periop Services  Operative Note    Surgery Date: 12/1/2023      Surgeon(s) and Role:     * Dean Pang MD - Primary    Assistant: none    Pre-op Diagnosis:  Right peroneus brevis tear    Post-op Diagnosis:  Same    Procedure:    Repair of right peroneus brevis tendon    Anesthesia:  General    EBL:  5 cc    Implants: * No implants in log *    Tourniquet time: 27 mins    Complication:   none    Procedure: The patient was taken to the operating room and placedlateral, right side up.  Anesthesia was administered and pre-operative antibiotics were given.  A timeout was performed.  Patient was positioned appropriately and prepped and draped in the usual sterile fashion.    Esmarch bandage was applied tourniquet was taken up to 300 mm Hg  The area of maximal tenderness was palpated preoperatively overlying the peroneal tendons in a lazy L-shaped incision was created just posterior and proximal along the course of the peroneal tendons and extending inferiorly below the fibula.  This incision was approximately 5 cm in length was carried through skin subcutaneous tissue.  The superficial peroneal retinaculum was identified found not to be disrupted and incised.  The underlying peroneus longus and peroneus brevis were exposed.  There was a low-lying peroneus brevis muscle belly which sharply excised and the peroneus brevis appeared to be somewhat flattened within the groove.  This represented a tear of the peroneus brevis.  Devitalized tendon was sharply excised and then repaired utilizing 0 Vicryl in a running baseball stitch configuration.  This served to tubularize the rather flattened peroneus brevis quite well.  This completed the peroneus brevis repair.  I then repaired the superior peroneal retinaculum with 2-0 Vicryl and the tourniquet was released and subcutaneous tissue was closed utilizing 2-0 Vicryl and a 3-0 Monocryl in a running subcuticular fashion  Sterile dressings were applied followed  by the application of a posterior slab splint  Disposition:awakened from anesthesia, extubated and taken to the recovery room in a stable condition, having suffered no apparent untoward event.

## 2023-12-01 NOTE — OR NURSING
0922-Pt rec'd to RR sedated, unresponsive to stimulation, on 6L O2 via simple fm, SaO2-95%, NADN, will titrate O2 PRN, resp unlabored, IV fluids infusing, dressing C/D/I to right ankle, toes are warm/dry to touch, + dorsalis pedis pulse noted, no s/s pain, will con't to monitor, safety measures in effect.    0935-Pt awaken and drowsy, responds to stimulation, placed on room air, SaO2-98%, NADN,     0945-Sat's at 89% on room air, able to wiggle toes, denies any c/o numbness/tingling at present, O2@2L via n/c applied.    0950-Pt remains drowsy, SaO2-94% on 2L O2 via n/c/    0952-Pt resting, drowsy, SaO2-95% on 2L O2 via n/c, dressing remains intact to right ankle, orders to discharge to [facility] room 24.    0956-Pt care released to Drea(RN), pt drowsy, family at bedside, vs hr-82, resp-12, SaO2-96% on 2L O2 via n/c, b/p 121/74.

## 2023-12-01 NOTE — ANESTHESIA PREPROCEDURE EVALUATION
12/01/2023  Naila Galeas is a 33 y.o., female.      Pre-op Assessment    I have reviewed the Patient Summary Reports.     I have reviewed the Nursing Notes. I have reviewed the NPO Status.   I have reviewed the Medications.     Review of Systems  Anesthesia Hx:             Denies Family Hx of Anesthesia complications.    Denies Personal Hx of Anesthesia complications.                    Social:  Non-Smoker, No Alcohol Use       Hematology/Oncology:  Hematology Normal   Oncology Normal                                   EENT/Dental:  EENT/Dental Normal           Cardiovascular:  Cardiovascular Normal                                            Pulmonary:  Pulmonary Normal                       Renal/:  Renal/ Normal                 Hepatic/GI:  Hepatic/GI Normal                 Musculoskeletal:  Musculoskeletal Normal                Neurological:  Neurology Normal                                      Endocrine:  Endocrine Normal          Obesity / BMI > 30  Dermatological:  Skin Normal    Psych:   anxiety                 Physical Exam  General: Well nourished, Cooperative, Alert and Oriented    Airway:  Mallampati: II / II  Mouth Opening: Normal  TM Distance: Normal  Neck ROM: Normal ROM    Dental:  Intact    Chest/Lungs:  Clear to auscultation    Heart:  Rate: Normal  Rhythm: Regular Rhythm  Sounds: Normal        Chemistry        Component Value Date/Time     11/15/2022 1536    K 3.9 11/15/2022 1536     11/15/2022 1536    CO2 27 11/15/2022 1536    BUN 12 11/15/2022 1536    CREATININE 0.81 11/15/2022 1536    GLU 87 11/15/2022 1536        Component Value Date/Time    CALCIUM 9.4 11/15/2022 1536    ALKPHOS 90 11/15/2022 1536    AST 24 11/15/2022 1536    ALT 40 11/15/2022 1536    BILITOT 0.4 11/15/2022 1536    ESTGFRAFRICA 107 07/16/2020 1048    EGFRNONAA 88 07/16/2020 1048        Lab Results    Component Value Date    WBC 5.34 11/15/2022    HGB 14.3 11/15/2022    HCT 44.7 11/15/2022     11/15/2022     No results found for this or any previous visit.    Collected: 12/01/23 0620  Final result      POC Preg Test, Ur Negative          Anesthesia Plan  Type of Anesthesia, risks & benefits discussed:    Anesthesia Type: Gen Supraglottic Airway  Intra-op Monitoring Plan: Standard ASA Monitors  Post Op Pain Control Plan: multimodal analgesia  Induction:  IV  Airway Plan: Direct  Informed Consent: Informed consent signed with the Patient and all parties understand the risks and agree with anesthesia plan.  All questions answered.   ASA Score: 2  Day of Surgery Review of History & Physical: H&P Update referred to the surgeon/provider.I have interviewed and examined the patient. I have reviewed the patient's H&P dated: There are no significant changes.     Ready For Surgery From Anesthesia Perspective.     .

## 2023-12-01 NOTE — CONSULTS
Consult acknowledged for knee scooter. Sw called knee scooter in to the medical store. [facility] aware that pt will . Ss following.

## 2023-12-01 NOTE — TRANSFER OF CARE
"Anesthesia Transfer of Care Note    Patient: Naila Galeas    Procedure(s) Performed: Procedure(s) (LRB):  REPAIR, TENDON, LOWER EXTREMITY (Right)    Patient location: PACU    Anesthesia Type: general    Transport from OR: Transported from OR on room air with adequate spontaneous ventilation    Post pain: adequate analgesia    Post assessment: no apparent anesthetic complications and tolerated procedure well    Post vital signs: stable    Level of consciousness: responds to stimulation    Nausea/Vomiting: no nausea/vomiting    Complications: none    Transfer of care protocol was followedComments: Report Given to PACU rn VSS      Last vitals: Visit Vitals  /75 (BP Location: Right arm, Patient Position: Lying)   Pulse 105   Temp 36.6 °C (97.8 °F) (Oral)   Resp (!) 24   Ht 5' 2" (1.575 m)   Wt 93 kg (205 lb)   SpO2 95%   Breastfeeding No   BMI 37.49 kg/m²     "

## 2023-12-01 NOTE — ANESTHESIA PROCEDURE NOTES
Intubation    Date/Time: 12/1/2023 7:57 AM    Performed by: Trip Ruiz CRNA  Authorized by: Buster Munson MD    Intubation:     Induction:  Intravenous    Intubated:  Postinduction    Mask Ventilation:  Easy mask    Attempted By:  CRNA    Method of Intubation:  Direct    Difficult Airway Encountered?: No      Complications:  None    Airway Device:  Supraglottic airway/LMA    Airway Device Size:  4.0    Style/Cuff Inflation:  Cuffed    Secured at:  The lips    Placement Verified By:  Capnometry    Complicating Factors:  None    Findings Post-Intubation:  BS equal bilateral and atraumatic/condition of teeth unchanged

## 2023-12-05 NOTE — ANESTHESIA POSTPROCEDURE EVALUATION
Anesthesia Post Evaluation    Patient: Naila Galeas    Procedure(s) Performed: Procedure(s) (LRB):  REPAIR, TENDON, LOWER EXTREMITY (Right)    Final Anesthesia Type: general      Patient location during evaluation: PACU  Patient participation: Yes- Able to Participate  Level of consciousness: awake and alert  Post-procedure vital signs: reviewed and stable  Pain management: adequate  Airway patency: patent  ALANIS mitigation strategies: Multimodal analgesia  PONV status at discharge: No PONV  Anesthetic complications: no      Cardiovascular status: blood pressure returned to baseline  Respiratory status: unassisted  Hydration status: euvolemic  Follow-up not needed.              Vitals Value Taken Time   /73 12/01/23 1031   Temp 36.6 °C (97.8 °F) 12/01/23 0927   Pulse 85 12/01/23 1032   Resp 16 12/01/23 1030   SpO2 96 % 12/01/23 1032   Vitals shown include unvalidated device data.      Event Time   Out of Recovery 09:52:00         Pain/Cassandra Score: No data recorded

## 2023-12-11 NOTE — DISCHARGE SUMMARY
[facility] - Orthopedic Periop Services  Discharge Note  Short Stay    Procedure(s) (LRB):  REPAIR, TENDON, LOWER EXTREMITY (Right)      OUTCOME: Patient tolerated treatment/procedure well without complication and is now ready for discharge.    DISPOSITION: Home or Self Care    FINAL DIAGNOSIS:  Right ankle pain    FOLLOWUP: In clinic    DISCHARGE INSTRUCTIONS:    Discharge Procedure Orders   Diet general     Call MD for:  temperature >100.4     Call MD for:  persistent nausea and vomiting     Call MD for:  severe uncontrolled pain     Call MD for:  difficulty breathing, headache or visual disturbances     Call MD for:  redness, tenderness, or signs of infection (pain, swelling, redness, odor or green/yellow discharge around incision site)     Call MD for:  hives     Call MD for:  persistent dizziness or light-headedness     Call MD for:  extreme fatigue     Leave dressing on - Keep it clean, dry, and intact until clinic visit     Weight bearing restrictions (specify)   Order Comments: Please refer to op note for therapy plan        TIME SPENT ON DISCHARGE: 9 minutes

## 2023-12-14 DIAGNOSIS — Z47.89 ORTHOPEDIC AFTERCARE: Primary | ICD-10-CM

## 2023-12-14 NOTE — PROGRESS NOTES
Peroneal tendon repair  Post-operative Protocol  ______________________________________________________________________________________    Phase I- Maximum Protection (Weeks 0 to 2):    Weeks 0 to 2:    Remain in Splint  Ice to reduce pain and inflammation  Elevate the ankle above the heart  Non-weight bearing x 4-6 weeks     Phase II- Range of Motion (Weeks 2 to 8):    Weeks 2 to 6:    Non-weight bearing in Hi-Top boot with cams locked all times except bathing and range of motion  Gentle active dorsiflexion and plantarflexion  No inversion or eversion    Weeks 6 to 7:    Initiate weight bearing progressing to WBAT in Hi-Top boot with cams Locked (rest and sleep in boot)  Continue with active dorsiflexion and plantarflexion    Weeks 7 to 8:    Week 7 - Initiate gentle active inversion and eversion  Initiate gentle strengthening  Open cams on boot (continue to rest and sleep in boot)  Begin stationary biking     Phase III- Progressive Strengthening (Weeks 9 to 12):    Weeks 9 to 10:    Out of boot, into ankle brace to be used when weight bearing only  Begin progressive weight bearing w/o boot with ankle brace  Full active range of motion  Full strengthening and proprioception drills  Begin stationary biking in shoe at week 9 with brace  Begin pool walking program progressing to jogging at week 12     Phase IV- Advanced Strengthening (Weeks 12-16)    Continue with end range stretching  Progress strengthening and proprioception work  Lower extremity PRE gym program  Initiate jogging 10-12 weeks based on progress  Initiate agilities 12-14 weeks      Return To Play at 4 months Per Physician Release

## 2023-12-15 ENCOUNTER — HOSPITAL ENCOUNTER (OUTPATIENT)
Dept: RADIOLOGY | Facility: HOSPITAL | Age: 33
Discharge: HOME | End: 2023-12-15
Attending: NURSE PRACTITIONER
Payer: COMMERCIAL

## 2023-12-15 ENCOUNTER — OFFICE VISIT (OUTPATIENT)
Dept: ORTHOPEDICS | Facility: CLINIC | Age: 33
End: 2023-12-15
Payer: COMMERCIAL

## 2023-12-15 DIAGNOSIS — Z47.89 ORTHOPEDIC AFTERCARE: Primary | ICD-10-CM

## 2023-12-15 DIAGNOSIS — Z47.89 ORTHOPEDIC AFTERCARE: ICD-10-CM

## 2023-12-15 PROCEDURE — 73610 X-RAY EXAM OF ANKLE: CPT | Mod: 26,RT,, | Performed by: STUDENT IN AN ORGANIZED HEALTH CARE EDUCATION/TRAINING PROGRAM

## 2023-12-15 PROCEDURE — 99213 OFFICE O/P EST LOW 20 MIN: CPT | Mod: PBBFAC | Performed by: NURSE PRACTITIONER

## 2023-12-15 PROCEDURE — 1160F RVW MEDS BY RX/DR IN RCRD: CPT | Mod: S$GLB,,, | Performed by: NURSE PRACTITIONER

## 2023-12-15 PROCEDURE — 1159F MED LIST DOCD IN RCRD: CPT | Mod: S$GLB,,, | Performed by: NURSE PRACTITIONER

## 2023-12-15 PROCEDURE — 1159F PR MEDICATION LIST DOCUMENTED IN MEDICAL RECORD: ICD-10-PCS | Mod: S$GLB,,, | Performed by: NURSE PRACTITIONER

## 2023-12-15 PROCEDURE — 1160F PR REVIEW ALL MEDS BY PRESCRIBER/CLIN PHARMACIST DOCUMENTED: ICD-10-PCS | Mod: S$GLB,,, | Performed by: NURSE PRACTITIONER

## 2023-12-15 PROCEDURE — 73610 XR ANKLE COMPLETE 3 VIEW RIGHT: ICD-10-PCS | Mod: 26,RT,, | Performed by: STUDENT IN AN ORGANIZED HEALTH CARE EDUCATION/TRAINING PROGRAM

## 2023-12-15 PROCEDURE — 73610 X-RAY EXAM OF ANKLE: CPT | Mod: TC,RT

## 2023-12-15 PROCEDURE — 99024 PR POST-OP FOLLOW-UP VISIT: ICD-10-PCS | Mod: S$GLB,,, | Performed by: NURSE PRACTITIONER

## 2023-12-15 PROCEDURE — 99024 POSTOP FOLLOW-UP VISIT: CPT | Mod: S$GLB,,, | Performed by: NURSE PRACTITIONER

## 2023-12-15 NOTE — PATIENT INSTRUCTIONS
Safety guidelines and activity restrictions are discussed with the patient.  Wound care provided.  Placed in a walking boot.  Remain nonweightbearing at this time.  We will use a knee scooter.  We discussed gentle dorsiflexion pedal flexion of the foot while avoidance of eversion and inversion of the ankle.  Return orthopedic clinic in 4 weeks follow-up Dr. Dean Pang with repeat x-ray left foot or sooner as needed.

## 2023-12-15 NOTE — PROGRESS NOTES
33-year-old female presents ambulatory to orthopedic clinic with knee scooter.  She is now 2 weeks following tendon repair of the left ankle.  She also has had a cuboid fracture as well.  Reports resolution of pain symptoms does not require further pain medication.      X-ray: X-rays today left ankle AP lateral and mortise view shows ankle mortise is well-maintained.  Casting material obscures fracture site.    PE:  She is noted be ambulating with a knee scooter nonweightbearing left lower extremity.  She has a posterior splint in place.  Posterior splint removed.  Surgical sites healing well without sign or symptom of infection.  Steri-Strips are noted.  Left pedal pulse 2/4.  Capillary refill all digits left foot less than 3 seconds.      Impression:  2 weeks following tendon repair left ankle with associated cuboid fracture     Plan:  Safety guidelines and activity restrictions are discussed with the patient.  Wound care provided.  Placed in a walking boot.  Remain nonweightbearing at this time.  We will use a knee scooter.  We discussed gentle dorsiflexion pedal flexion of the foot while avoidance of eversion and inversion of the ankle.  Return orthopedic clinic in 4 weeks follow-up Dr. Dena Pang with repeat x-ray left foot or sooner as needed.

## 2024-01-16 ENCOUNTER — OFFICE VISIT (OUTPATIENT)
Dept: ORTHOPEDICS | Facility: CLINIC | Age: 34
End: 2024-01-16
Payer: COMMERCIAL

## 2024-01-16 DIAGNOSIS — Z47.89 ORTHOPEDIC AFTERCARE: Primary | ICD-10-CM

## 2024-01-16 PROCEDURE — 1160F RVW MEDS BY RX/DR IN RCRD: CPT | Mod: S$GLB,,, | Performed by: ORTHOPAEDIC SURGERY

## 2024-01-16 PROCEDURE — 99024 POSTOP FOLLOW-UP VISIT: CPT | Mod: S$GLB,,, | Performed by: ORTHOPAEDIC SURGERY

## 2024-01-16 PROCEDURE — 99212 OFFICE O/P EST SF 10 MIN: CPT | Mod: PBBFAC | Performed by: ORTHOPAEDIC SURGERY

## 2024-01-16 PROCEDURE — 1159F MED LIST DOCD IN RCRD: CPT | Mod: S$GLB,,, | Performed by: ORTHOPAEDIC SURGERY

## 2024-01-26 ENCOUNTER — CLINICAL SUPPORT (OUTPATIENT)
Dept: REHABILITATION | Facility: HOSPITAL | Age: 34
End: 2024-01-26
Payer: COMMERCIAL

## 2024-01-26 DIAGNOSIS — M25.671 DECREASED RANGE OF MOTION OF RIGHT ANKLE: Primary | ICD-10-CM

## 2024-01-26 DIAGNOSIS — Z47.89 ORTHOPEDIC AFTERCARE: ICD-10-CM

## 2024-01-26 DIAGNOSIS — R26.9 ABNORMAL GAIT: ICD-10-CM

## 2024-01-26 DIAGNOSIS — R29.898 ANKLE WEAKNESS: ICD-10-CM

## 2024-01-26 PROCEDURE — 97161 PT EVAL LOW COMPLEX 20 MIN: CPT

## 2024-01-30 ENCOUNTER — CLINICAL SUPPORT (OUTPATIENT)
Dept: REHABILITATION | Facility: HOSPITAL | Age: 34
End: 2024-01-30
Payer: COMMERCIAL

## 2024-01-30 DIAGNOSIS — Z47.89 ORTHOPEDIC AFTERCARE: Primary | ICD-10-CM

## 2024-01-30 DIAGNOSIS — R29.898 ANKLE WEAKNESS: ICD-10-CM

## 2024-01-30 DIAGNOSIS — M25.671 DECREASED RANGE OF MOTION OF RIGHT ANKLE: ICD-10-CM

## 2024-01-30 DIAGNOSIS — R26.9 ABNORMAL GAIT: ICD-10-CM

## 2024-01-30 PROCEDURE — 97110 THERAPEUTIC EXERCISES: CPT

## 2024-01-30 PROCEDURE — 97112 NEUROMUSCULAR REEDUCATION: CPT

## 2024-01-30 NOTE — PROGRESS NOTES
[facility] OUTPATIENT [facility]ILIT[facility]  Physical Therapy Treatment Note    Name: Naila Galeas  Clinic Number: [id number]    Therapy Diagnosis:   Encounter Diagnoses   Name Primary?    Orthopedic aftercare      Decreased range of motion of right ankle Yes    Abnormal gait      Ankle weakness      Physician: Dean Pang MD    Visit Date: 1/30/2024    Physician Orders: PT Eval and Treat  Medical Diagnosis from Referral: Z47.89 (ICD-10-CM) - Orthopedic aftercare  Evaluation Date: 1/26/2024  Authorization Period Expiration: 1/15/2025  Plan of Care Expiration: 3/8/2024  Visit # / Visits authorized: 1/13 (20 visit hard max)  PTA Visit #: 0    Time In: 0947  Time Out: 1015  Total Billable Time: 28 minutes    Precautions: Standard  Functional Level Prior to Evaluation: independent with all functional mobility without assistive device    Subjective     Pt reports: Her right ankle is feeling good this morning. Patient reports she is stiff first thing in the morning but improves as she moves.    She was compliant with home exercise program.  Response to previous treatment: This is patient's first treatment session following initial evaluation.     Pain: 3/10  Location: right ankle    Objective     Naila received therapeutic exercises to develop strength, ROM, and flexibility for 15 minutes including:    Recumbent bike: 5 minutes  Right ankle circles (clockwise/counterclockwise): x 20 reps each  Right ankle ABCs: x 1 set  Calf stretch on slant board: 2 minutes    Naila received the following manual therapy techniques for 5 minutes, including:    Manual stretch of the right calf (gastrocnemius and soleus)     Naila participated in neuromuscular re-education activities to improve: Balance, Coordination, and Proprioception for 8 minutes. The following activities were included:    [place] pick ups with right foot: x 20 reps  Towel scrunches (right): x 2 sets    Naila participated in dynamic functional therapeutic  activities to improve functional performance for 0 minutes, including:    (not performed)     Naila participated in gait training to improve functional mobility and safety for 0 minutes, including:    (not performed)    Naila received an ice massage to the right lateral ankle for 5 minutes.    Right ankle active range of motion:  6 degrees dorsiflexion  13 degrees eversion     Home Exercises Provided and Patient Education Provided     Education provided: Patient educated on the importance of completing skilled physical therapy treatment and home exercise program as prescribed to maximize functional gains.     Written Home Exercises Provided: Patient instructed to cont prior HEP. Exercises were reviewed and Naila was able to demonstrate them prior to the end of the session.  Naila demonstrated good understanding of the education provided.     See EMR under Patient Instructions for exercises provided prior visit.    Assessment     Patient with good effort throughout treatment. Patient with improved right ankle dorsiflexion and eversion this treatment. Patient will be able to wean out of her right walking boot beginning on 2/2/2024. Physical Therapist will increase focus on gait training at that time. Physical Therapist will continue to progress therapeutic exercise, neuromuscular re-education, therapeutic activities, and gait training as able with manual therapy and modalities utilized as needed.     Naila isprogressing well towards her goals.   Pt prognosis is Good.     Pt will continue to benefit from skilled outpatient physical therapy to address the deficits listed in the problem list box on initial evaluation, provide pt/family education, and to maximize pt's level of independence in the home and community environment.     Pt's spiritual, cultural, and educational needs considered and pt agreeable to plan of care and goals.     Anticipated barriers to physical therapy: compliance with home exercise program;  natural course of healing    Goals:    Short Term Goals:  Patient will demonstrate independence with home exercise program to ensure carryover of treatment.  Patient will demonstrate neutral right ankle dorsiflexion range of motion josé miguel improve functional use of the right ankle.  Patient will demonstrate 20 degrees of right ankle eversion active range of motion to improve functional use of the right ankle.  Patient will improve right ankle strength to 3+/5 to improve independence and safety with bed mobility, transfers, and gait.  Patient will ambulate 150 feet without assistive device or walking boot with independence with normal gait mechanics to improve independence and safety with gait.   Patient will ascend/descend 5 steps reciprocally with bilateral upper extremity support to improve independence and safety with stair negotiation.      Long Term Goals:  Patient will demonstrate 5 degrees of right ankle dorsiflexion active range of motion to improve functional use of the right ankle.   Patient will improve right ankle strength to 4/5 to improve independence and safety with bed mobility, transfers, and gait.  Patient will ambulate 300 feet without assistive device or walking boot  with independence  with normal gait mechanics including up/down curbs and ramps to improve independence and safety with community ambulation.  Patient will ascend/descend 5 steps reciprocally without upper extremity support to improve independence and safety with stair negotiation.     Plan     Patient will continue to benefit from skilled physical therapy treatment as prescribed working towards goals listed above to maximize functional potential.       Benjamin Sánchez, PT, DPT  1/30/2024

## 2024-02-02 ENCOUNTER — CLINICAL SUPPORT (OUTPATIENT)
Dept: REHABILITATION | Facility: HOSPITAL | Age: 34
End: 2024-02-02
Payer: COMMERCIAL

## 2024-02-02 DIAGNOSIS — M25.671 DECREASED RANGE OF MOTION OF RIGHT ANKLE: ICD-10-CM

## 2024-02-02 DIAGNOSIS — Z47.89 ORTHOPEDIC AFTERCARE: Primary | ICD-10-CM

## 2024-02-02 DIAGNOSIS — R29.898 ANKLE WEAKNESS: ICD-10-CM

## 2024-02-02 DIAGNOSIS — R26.9 ABNORMAL GAIT: ICD-10-CM

## 2024-02-02 PROCEDURE — 97116 GAIT TRAINING THERAPY: CPT | Mod: CQ

## 2024-02-02 PROCEDURE — 97110 THERAPEUTIC EXERCISES: CPT | Mod: CQ

## 2024-02-02 PROCEDURE — 97112 NEUROMUSCULAR REEDUCATION: CPT | Mod: CQ

## 2024-02-02 NOTE — PROGRESS NOTES
[facility]  Physical Therapy Treatment Note    Name: Naila Galeas  Clinic Number: [id number]    Therapy Diagnosis:   Encounter Diagnoses   Name Primary?    Orthopedic aftercare      Decreased range of motion of right ankle Yes    Abnormal gait      Ankle weakness      Physician: Dean Pang MD    Visit Date: 2/2/2024    Physician Orders: PT Eval and Treat  Medical Diagnosis from Referral: Z47.89 (ICD-10-CM) - Orthopedic aftercare  Evaluation Date: 1/26/2024  Authorization Period Expiration: 1/15/2025  Plan of Care Expiration: 3/8/2024  Visit # / Visits authorized: 3/13 (20 visit hard max)  PTA Visit #: 1    Time In: 0931  Time Out: 1012  Total Billable Time: 41 minutes    Precautions: Standard  Functional Level Prior to Evaluation: independent with all functional mobility without assistive device    Subjective     Pt reports: Her right ankle is feels okay this morning. Patient states it feels weird ambulating without boot on.    She was compliant with home exercise program.  Response to previous treatment: This is patient's first treatment session following initial evaluation.     Pain: 4/10  Location: right ankle    Objective     Naila received therapeutic exercises to develop strength, ROM, and flexibility for 15 minutes including:    Recumbent bike: 5 minutes (NuStep performed today)  Right ankle circles (clockwise/counterclockwise): x 20 reps each  Right ankle ABCs: x 1 set  Calf stretch on slant board: 2 minutes  Ankle 4 way (dorsiflexion/plantar flexion/inversion/eversion): yellow theraband; x 20 reps each    Naila received the following manual therapy techniques for 6 minutes, including:    Manual stretch of the right calf (gastrocnemius and soleus)     Naila participated in neuromuscular re-education activities to improve: Balance, Coordination, and Proprioception for 8 minutes. The following activities were included:    [place] pick ups with right foot: x 20  reps  Towel scrunches (right): x 2 sets    Naila participated in dynamic functional therapeutic activities to improve functional performance for 0 minutes, including:    (not performed)     Naila participated in gait training to improve functional mobility and safety for 8 minutes, including:    Rock steps in parallel bars: x 15 reps  Gait training in parallel bars with focus on bilateral heel strike and toe off    Naila received an ice massage to the right lateral ankle for 5 minutes.    Right ankle active range of motion:  6 degrees dorsiflexion  13 degrees eversion     Home Exercises Provided and Patient Education Provided     Education provided: Patient educated on the importance of completing skilled physical therapy treatment and home exercise program as prescribed to maximize functional gains.     Written Home Exercises Provided: Patient instructed to cont prior HEP. Exercises were reviewed and Naila was able to demonstrate them prior to the end of the session.  Naila demonstrated good understanding of the education provided.     See EMR under Patient Instructions for exercises provided prior visit.    Assessment     Patient with good effort throughout treatment. Patient with good tolerance of added gait training without boot and ankle 4 way with theraband. Patient able to perform all exercises with no complaints. Physical Therapist Assistant will continue to progress therapeutic exercise, neuromuscular re-education, therapeutic activities, and gait training as able with manual therapy and modalities utilized as needed.     Naila isprogressing well towards her goals.   Pt prognosis is Good.     Pt will continue to benefit from skilled outpatient physical therapy to address the deficits listed in the problem list box on initial evaluation, provide pt/family education, and to maximize pt's level of independence in the home and community environment.     Pt's spiritual, cultural, and educational needs considered  and pt agreeable to plan of care and goals.     Anticipated barriers to physical therapy: compliance with home exercise program; natural course of healing    Goals:    Short Term Goals:  Patient will demonstrate independence with home exercise program to ensure carryover of treatment.  Patient will demonstrate neutral right ankle dorsiflexion range of motion josé miguel improve functional use of the right ankle.  Patient will demonstrate 20 degrees of right ankle eversion active range of motion to improve functional use of the right ankle.  Patient will improve right ankle strength to 3+/5 to improve independence and safety with bed mobility, transfers, and gait.  Patient will ambulate 150 feet without assistive device or walking boot with independence with normal gait mechanics to improve independence and safety with gait.   Patient will ascend/descend 5 steps reciprocally with bilateral upper extremity support to improve independence and safety with stair negotiation.      Long Term Goals:  Patient will demonstrate 5 degrees of right ankle dorsiflexion active range of motion to improve functional use of the right ankle.   Patient will improve right ankle strength to 4/5 to improve independence and safety with bed mobility, transfers, and gait.  Patient will ambulate 300 feet without assistive device or walking boot  with independence  with normal gait mechanics including up/down curbs and ramps to improve independence and safety with community ambulation.  Patient will ascend/descend 5 steps reciprocally without upper extremity support to improve independence and safety with stair negotiation.     Plan     Patient will continue to benefit from skilled physical therapy treatment as prescribed working towards goals listed above to maximize functional potential.       LINCOLN Tariq  2/2/2024

## 2024-02-05 ENCOUNTER — CLINICAL SUPPORT (OUTPATIENT)
Dept: REHABILITATION | Facility: HOSPITAL | Age: 34
End: 2024-02-05
Payer: COMMERCIAL

## 2024-02-05 DIAGNOSIS — R26.9 ABNORMAL GAIT: ICD-10-CM

## 2024-02-05 DIAGNOSIS — R29.898 ANKLE WEAKNESS: ICD-10-CM

## 2024-02-05 DIAGNOSIS — M25.671 DECREASED RANGE OF MOTION OF RIGHT ANKLE: ICD-10-CM

## 2024-02-05 DIAGNOSIS — Z47.89 ORTHOPEDIC AFTERCARE: Primary | ICD-10-CM

## 2024-02-05 PROCEDURE — 97140 MANUAL THERAPY 1/> REGIONS: CPT | Mod: CQ

## 2024-02-05 PROCEDURE — 97110 THERAPEUTIC EXERCISES: CPT | Mod: CQ

## 2024-02-05 PROCEDURE — 97112 NEUROMUSCULAR REEDUCATION: CPT | Mod: CQ

## 2024-02-05 NOTE — PROGRESS NOTES
[facility]  Physical Therapy Treatment Note    Name: Naila Galeas  Clinic Number: [id number]    Therapy Diagnosis:   Encounter Diagnoses   Name Primary?    Orthopedic aftercare      Decreased range of motion of right ankle Yes    Abnormal gait      Ankle weakness      Physician: Dean Pang MD    Visit Date: 2/5/2024    Physician Orders: PT Eval and Treat  Medical Diagnosis from Referral: Z47.89 (ICD-10-CM) - Orthopedic aftercare  Evaluation Date: 1/26/2024  Authorization Period Expiration: 1/15/2025  Plan of Care Expiration: 3/8/2024  Visit # / Visits authorized: 4/13 (20 visit hard max)  PTA Visit #: 2    Time In: 0931  Time Out: 1010  Total Billable Time: 39 minutes    Precautions: Standard  Functional Level Prior to Evaluation: independent with all functional mobility without assistive device    Subjective     Pt reports: her ankle is doing okay with only slight pain noted. Patient with noticeably improving gait upon arrival.    She was compliant with home exercise program.  Response to previous treatment: This is patient's first treatment session following initial evaluation.     Pain: 2/10  Location: right ankle    Objective     Naila received therapeutic exercises to develop strength, ROM, and flexibility for 15 minutes including:    Recumbent bike: 5 minutes; level 5  Calf stretch on slant board: 2 minutes  Right ankle circles (clockwise/counterclockwise): x 20 reps each  Right ankle ABCs: x 1 set  Ankle 4 way (dorsiflexion/plantar flexion/inversion/eversion): yellow theraband; x 20 reps each  Seated heel raises: x 20 reps     Naila received the following manual therapy techniques for 8 minutes, including:    Manual stretch of the right calf (gastrocnemius and soleus)   Myofascial release to right ankle musculature    Naila participated in neuromuscular re-education activities to improve: Balance, Coordination, and Proprioception for 8 minutes. The following  activities were included:    [place] pick ups with right foot: x 20 reps  Towel scrunches (right): x 2 sets    Naila participated in dynamic functional therapeutic activities to improve functional performance for 0 minutes, including:    (not performed)     Naila participated in gait training to improve functional mobility and safety for 3 minutes, including:    Rock steps in parallel bars: x 15 reps    Gait training in parallel bars with focus on bilateral heel strike and toe off (not performed)    Naila received an ice massage to the right lateral ankle for 5 minutes.    Right ankle active range of motion:  6 degrees dorsiflexion  13 degrees eversion     Home Exercises Provided and Patient Education Provided     Education provided: Patient educated on the importance of completing skilled physical therapy treatment and home exercise program as prescribed to maximize functional gains.     Written Home Exercises Provided: Patient instructed to cont prior HEP. Exercises were reviewed and Naila was able to demonstrate them prior to the end of the session.  Naila demonstrated good understanding of the education provided.     See EMR under Patient Instructions for exercises provided prior visit.    Assessment     Patient with good effort throughout treatment. Patient with good tolerance of added seated heel raises. Patient able to perform all exercises with no complaint of increase in pain. Physical Therapist Assistant will continue to progress therapeutic exercise, neuromuscular re-education, therapeutic activities, and gait training as able with manual therapy and modalities utilized as needed.     Naila isprogressing well towards her goals.   Pt prognosis is Good.     Pt will continue to benefit from skilled outpatient physical therapy to address the deficits listed in the problem list box on initial evaluation, provide pt/family education, and to maximize pt's level of independence in the home and community  environment.     Pt's spiritual, cultural, and educational needs considered and pt agreeable to plan of care and goals.     Anticipated barriers to physical therapy: compliance with home exercise program; natural course of healing    Goals:    Short Term Goals:  Patient will demonstrate independence with home exercise program to ensure carryover of treatment.  Patient will demonstrate neutral right ankle dorsiflexion range of motion josé miguel improve functional use of the right ankle.  Patient will demonstrate 20 degrees of right ankle eversion active range of motion to improve functional use of the right ankle.  Patient will improve right ankle strength to 3+/5 to improve independence and safety with bed mobility, transfers, and gait.  Patient will ambulate 150 feet without assistive device or walking boot with independence with normal gait mechanics to improve independence and safety with gait.   Patient will ascend/descend 5 steps reciprocally with bilateral upper extremity support to improve independence and safety with stair negotiation.      Long Term Goals:  Patient will demonstrate 5 degrees of right ankle dorsiflexion active range of motion to improve functional use of the right ankle.   Patient will improve right ankle strength to 4/5 to improve independence and safety with bed mobility, transfers, and gait.  Patient will ambulate 300 feet without assistive device or walking boot  with independence  with normal gait mechanics including up/down curbs and ramps to improve independence and safety with community ambulation.  Patient will ascend/descend 5 steps reciprocally without upper extremity support to improve independence and safety with stair negotiation.     Plan     Patient will continue to benefit from skilled physical therapy treatment as prescribed working towards goals listed above to maximize functional potential.       LINCOLN Tarqi  2/5/2024

## 2024-02-13 ENCOUNTER — CLINICAL SUPPORT (OUTPATIENT)
Dept: REHABILITATION | Facility: HOSPITAL | Age: 34
End: 2024-02-13
Payer: COMMERCIAL

## 2024-02-13 DIAGNOSIS — R29.898 ANKLE WEAKNESS: ICD-10-CM

## 2024-02-13 DIAGNOSIS — R26.9 ABNORMAL GAIT: ICD-10-CM

## 2024-02-13 DIAGNOSIS — M25.671 DECREASED RANGE OF MOTION OF RIGHT ANKLE: ICD-10-CM

## 2024-02-13 DIAGNOSIS — Z47.89 ORTHOPEDIC AFTERCARE: Primary | ICD-10-CM

## 2024-02-13 PROCEDURE — 97112 NEUROMUSCULAR REEDUCATION: CPT

## 2024-02-13 PROCEDURE — 97140 MANUAL THERAPY 1/> REGIONS: CPT

## 2024-02-13 PROCEDURE — 97110 THERAPEUTIC EXERCISES: CPT

## 2024-02-13 NOTE — PROGRESS NOTES
[facility]  Physical Therapy Treatment Note    Name: Naila Galeas  Clinic Number: [id number]    Therapy Diagnosis:   Encounter Diagnoses   Name Primary?    Orthopedic aftercare      Decreased range of motion of right ankle Yes    Abnormal gait      Ankle weakness      Physician: Dean Pang MD    Visit Date: 2/13/2024    Physician Orders: PT Eval and Treat  Medical Diagnosis from Referral: Z47.89 (ICD-10-CM) - Orthopedic aftercare  Evaluation Date: 1/26/2024  Authorization Period Expiration: 1/15/2025  Plan of Care Expiration: 3/8/2024  Visit # / Visits authorized: 5/13 (20 visit hard max)  PTA Visit #: 0    Time In: 0931  Time Out: 1010  Total Billable Time: 39 minutes    Precautions: Standard  Functional Level Prior to Evaluation: independent with all functional mobility without assistive device    Subjective     Pt reports: Her ankle is doing good. Patient reports no new complaints of pain.     She was compliant with home exercise program.  Response to previous treatment: good; continued progress    Pain: 1/10  Location: right ankle    Objective     Naila received therapeutic exercises to develop strength, ROM, and flexibility for 15 minutes including:    NuStep: 5 minutes  Calf stretch on slant board: 2 minutes  Right ankle circles (clockwise/counterclockwise): x 20 reps each  Right ankle ABCs: x 1 set  Ankle 4 way (dorsiflexion/plantar flexion/inversion/eversion): red theraband; x 20 reps each  Seated heel raises: x 20 reps     Naila received the following manual therapy techniques for 8 minutes, including:    Manual stretch of the right calf (gastrocnemius and soleus)     Myofascial release to right ankle musculature (not performed)    Naila participated in neuromuscular re-education activities to improve: Balance, Coordination, and Proprioception for 13 minutes. The following activities were included:    BAPS board (ball #2) with the right foot/ankle: x 10 reps each  direction (clockwise/counterclockwise)  Single limb stance (right) on foam without upper extremity support: 4 x 15 seconds  Tandem walking (forward/backwards) on foam beam with least required upper extremity support: x 3 laps  Side steps on foam beam with least required upper extremity support: x 3 laps  Right single leg stance with ball toss (red) to rebounder: x 15 reps    [place] pick ups with right foot: x 20 reps (not performed)  Towel scrunches (right): x 2 sets (not performed)    Naila participated in dynamic functional therapeutic activities to improve functional performance for 3 minutes, including:    Forward step-ups (right foot only) with unilateral upper extremity support: 2 x 10 reps    Naila participated in gait training to improve functional mobility and safety for 0 minutes, including:    Rock steps in parallel bars: x 15 reps (not performed)  Gait training in parallel bars with focus on bilateral heel strike and toe off (not performed)    Naila received an ice massage to the right lateral ankle for 0  minutes.    Right ankle active range of motion:  6 degrees dorsiflexion  13 degrees eversion     Home Exercises Provided and Patient Education Provided     Education provided: Patient educated on the importance of completing skilled physical therapy treatment and home exercise program as prescribed to maximize functional gains.     Written Home Exercises Provided: Patient instructed to cont prior HEP. Exercises were reviewed and Naila was able to demonstrate them prior to the end of the session.  Naila demonstrated good understanding of the education provided.     See EMR under Patient Instructions for exercises provided prior visit.    Assessment     Patient with good effort throughout treatment. Patient with good tolerance of added neuromuscular re-education and therapeutic activities. Patient able to perform all exercises with no complaint of increase in pain. Patient with no significant gait  abnormalities noted today without assistive device and without walking boot. Patient follows-up with Dr. Dean Pang on 2/15/2024. Physical Therapist will continue to progress therapeutic exercise, neuromuscular re-education, therapeutic activities, and gait training as able with manual therapy and modalities utilized as needed.     Naila isprogressing well towards her goals.   Pt prognosis is Good.     Pt will continue to benefit from skilled outpatient physical therapy to address the deficits listed in the problem list box on initial evaluation, provide pt/family education, and to maximize pt's level of independence in the home and community environment.     Pt's spiritual, cultural, and educational needs considered and pt agreeable to plan of care and goals.     Anticipated barriers to physical therapy: compliance with home exercise program; natural course of healing    Goals:    Short Term Goals:  Patient will demonstrate independence with home exercise program to ensure carryover of treatment. [Met]  Patient will demonstrate neutral right ankle dorsiflexion range of motion josé miguel improve functional use of the right ankle. [Met]  Patient will demonstrate 20 degrees of right ankle eversion active range of motion to improve functional use of the right ankle.  Patient will improve right ankle strength to 3+/5 to improve independence and safety with bed mobility, transfers, and gait. [Met]  Patient will ambulate 150 feet without assistive device or walking boot with independence with normal gait mechanics to improve independence and safety with gait. [Met]  Patient will ascend/descend 5 steps reciprocally with bilateral upper extremity support to improve independence and safety with stair negotiation.      Long Term Goals:  Patient will demonstrate 5 degrees of right ankle dorsiflexion active range of motion to improve functional use of the right ankle. [Met]  Patient will improve right ankle strength to 4/5 to improve  independence and safety with bed mobility, transfers, and gait.  Patient will ambulate 300 feet without assistive device or walking boot  with independence  with normal gait mechanics including up/down curbs and ramps to improve independence and safety with community ambulation.  Patient will ascend/descend 5 steps reciprocally without upper extremity support to improve independence and safety with stair negotiation.     Plan     Patient will continue to benefit from skilled physical therapy treatment as prescribed working towards goals listed above to maximize functional potential.       Benjamin Sánchez, PT, DPT  2/13/2024

## 2024-02-15 ENCOUNTER — OFFICE VISIT (OUTPATIENT)
Dept: ORTHOPEDICS | Facility: CLINIC | Age: 34
End: 2024-02-15
Payer: COMMERCIAL

## 2024-02-15 DIAGNOSIS — Z47.89 ORTHOPEDIC AFTERCARE: Primary | ICD-10-CM

## 2024-02-15 PROCEDURE — 99212 OFFICE O/P EST SF 10 MIN: CPT | Mod: PBBFAC | Performed by: ORTHOPAEDIC SURGERY

## 2024-02-15 PROCEDURE — 99024 POSTOP FOLLOW-UP VISIT: CPT | Mod: S$GLB,,, | Performed by: ORTHOPAEDIC SURGERY

## 2024-02-16 ENCOUNTER — CLINICAL SUPPORT (OUTPATIENT)
Dept: REHABILITATION | Facility: HOSPITAL | Age: 34
End: 2024-02-16
Payer: COMMERCIAL

## 2024-02-16 DIAGNOSIS — R29.898 ANKLE WEAKNESS: ICD-10-CM

## 2024-02-16 DIAGNOSIS — R26.9 ABNORMAL GAIT: ICD-10-CM

## 2024-02-16 DIAGNOSIS — M25.671 DECREASED RANGE OF MOTION OF RIGHT ANKLE: ICD-10-CM

## 2024-02-16 DIAGNOSIS — Z47.89 ORTHOPEDIC AFTERCARE: Primary | ICD-10-CM

## 2024-02-16 PROCEDURE — 97110 THERAPEUTIC EXERCISES: CPT | Mod: CQ

## 2024-02-16 PROCEDURE — 97112 NEUROMUSCULAR REEDUCATION: CPT | Mod: CQ

## 2024-02-16 NOTE — PROGRESS NOTES
[facility]  Physical Therapy Treatment Note    Name: Naila Galeas  Clinic Number: [id number]    Therapy Diagnosis:   Encounter Diagnoses   Name Primary?    Orthopedic aftercare      Decreased range of motion of right ankle Yes    Abnormal gait      Ankle weakness      Physician: Dean Pang MD    Visit Date: 2/16/2024    Physician Orders: PT Eval and Treat  Medical Diagnosis from Referral: Z47.89 (ICD-10-CM) - Orthopedic aftercare  Evaluation Date: 1/26/2024  Authorization Period Expiration: 1/15/2025  Plan of Care Expiration: 3/8/2024  Visit # / Visits authorized: 6/13 (20 visit hard max)  PTA Visit #: 1    Time In: 0930  Time Out: 1005  Total Billable Time: 35 minutes    Precautions: Standard  Functional Level Prior to Evaluation: independent with all functional mobility without assistive device    Subjective     Pt reports: her ankle is doing great with no pain or discomfort since last visit; patient states she saw Dr Moran yesterday and he released her     She was compliant with home exercise program.  Response to previous treatment: good; continued progress    Pain: 0/10  Location: right ankle    Objective     Naila received therapeutic exercises to develop strength, ROM, and flexibility for 14 minutes including:    NuStep: 5 minutes  Calf stretch on slant board: 2 minutes  Right ankle ABCs: x 1 set  Ankle 4 way (dorsiflexion/plantar flexion/inversion/eversion): green theraband; x 20 reps each  Standing heel raises: x 20 reps     Right ankle circles (clockwise/counterclockwise): x 20 reps each (not performed)    Naila received the following manual therapy techniques for 5 minutes, including:    Manual stretch of the right calf (gastrocnemius and soleus)     Myofascial release to right ankle musculature (not performed)    Naila participated in neuromuscular re-education activities to improve: Balance, Coordination, and Proprioception for 13 minutes. The following  "activities were included:    BAPS board (ball #2) with the right foot/ankle: x 10 reps each direction (clockwise/counterclockwise)  Single limb stance (right) on foam without upper extremity support: 4 x 15 seconds  Tandem walking (forward/backwards) on foam beam with least required upper extremity support: x 3 laps  Side steps on foam beam with least required upper extremity support: x 3 laps  Right single leg stance with ball toss (red) to rebounder: x 15 reps    Cumming pick ups with right foot: x 20 reps (not performed)  Towel scrunches (right): x 2 sets (not performed)    Naila participated in dynamic functional therapeutic activities to improve functional performance for 3 minutes, including:    Forward step-ups on 6" step (right foot only) with no upper extremity support: 2 x 10 reps    Naila participated in gait training to improve functional mobility and safety for 0 minutes, including:    Rock steps in parallel bars: x 15 reps (not performed)  Gait training in parallel bars with focus on bilateral heel strike and toe off (not performed)    Naila received an ice massage to the right lateral ankle for 0  minutes.    Right ankle active range of motion:  6 degrees dorsiflexion  9 degrees eversion     Home Exercises Provided and Patient Education Provided     Education provided: Patient educated on the importance of completing skilled physical therapy treatment and home exercise program as prescribed to maximize functional gains.     Written Home Exercises Provided: Patient instructed to cont prior HEP. Exercises were reviewed and Naila was able to demonstrate them prior to the end of the session.  Naila demonstrated good understanding of the education provided.     See EMR under Patient Instructions for exercises provided prior visit.    Assessment     Patient followed up with Dr Dean Pang yesterday and he released her. Patient with good effort throughout treatment. Patient with good tolerance of all " neuromuscular re-education and therapeutic activities. Patient able to perform all exercises with no complaint of increase in pain. Patient to discharge with updated home exercises program.    Naila isprogressing well towards her goals.   Pt prognosis is Good.     Pt will continue to benefit from skilled outpatient physical therapy to address the deficits listed in the problem list box on initial evaluation, provide pt/family education, and to maximize pt's level of independence in the home and community environment.     Pt's spiritual, cultural, and educational needs considered and pt agreeable to plan of care and goals.     Anticipated barriers to physical therapy: compliance with home exercise program; natural course of healing    Goals:    Short Term Goals:  Patient will demonstrate independence with home exercise program to ensure carryover of treatment. [Met]  Patient will demonstrate neutral right ankle dorsiflexion range of motion josé miguel improve functional use of the right ankle. [Met]  Patient will demonstrate 20 degrees of right ankle eversion active range of motion to improve functional use of the right ankle.  Patient will improve right ankle strength to 3+/5 to improve independence and safety with bed mobility, transfers, and gait. [Met]  Patient will ambulate 150 feet without assistive device or walking boot with independence with normal gait mechanics to improve independence and safety with gait. [Met]  Patient will ascend/descend 5 steps reciprocally with bilateral upper extremity support to improve independence and safety with stair negotiation.      Long Term Goals:  Patient will demonstrate 5 degrees of right ankle dorsiflexion active range of motion to improve functional use of the right ankle. [Met]  Patient will improve right ankle strength to 4/5 to improve independence and safety with bed mobility, transfers, and gait.  Patient will ambulate 300 feet without assistive device or walking boot   with independence  with normal gait mechanics including up/down curbs and ramps to improve independence and safety with community ambulation.  Patient will ascend/descend 5 steps reciprocally without upper extremity support to improve independence and safety with stair negotiation.     Plan     Patient to discharge with updated home exercises program      LINCOLN Tariq  2/16/2024

## 2024-02-16 NOTE — PROGRESS NOTES
33 y.o. Female returns to clinic for a follow up visit regarding No diagnosis found.     Doing great. No complaints       Past Medical History:   Diagnosis Date    Anxiety      Past Surgical History:   Procedure Laterality Date    REPAIR OF TENDON OF LOWER EXTREMITY Right 12/1/2023    Procedure: REPAIR, TENDON, LOWER EXTREMITY;  Surgeon: Dean Pang MD;  Location: [facility];  Service: Orthopedics;  Laterality: Right;  ANKLE/FOOT         PHYSICAL EXAMINATION:    Ortho/SPM Exam  Excellent tibiotalar range of motion no tenderness over the surgical incision.  No pain with the bursa inversion of the foot    IMAGING:  No results found.     ASSESSMENT:    No diagnosis found.    PLAN:     -Findings and treatment options were discussed with the patient  -All questions answered      Overall doing well.  Okay to begin advancing activity as tolerated.  Is then break following surgery to follow up on an as needed basis    There are no Patient Instructions on file for this visit.      No orders of the defined types were placed in this encounter.        Procedures

## 2024-02-20 NOTE — PLAN OF CARE
"[facility]  Physical Therapy Initial Evaluation    Name: Naila Galeas  Clinic Number: [id number]    Therapy Diagnosis:   Encounter Diagnoses   Name Primary?    Orthopedic aftercare     Decreased range of motion of right ankle Yes    Abnormal gait     Ankle weakness      Physician: Dean Lazo MD    Physician Orders: PT Eval and Treat  Medical Diagnosis from Referral: Z47.89 (ICD-10-CM) - Orthopedic aftercare  Evaluation Date: 1/26/2024  Authorization Period Expiration: 1/15/2025  Plan of Care Expiration: 3/8/2024  Visit # / Visits authorized: 1/ (20 visit hard max)    Time In: 0935  Time Out: 1005  Total Appointment Time (timed & untimed codes): 30 minutes    Precautions: Standard    Subjective     Date of onset: October 2023    History of current condition - Naila reports she injured her right ankle/foot after falling down her porch steps in October 2023. Patient is now status post right peroneal brevis on 12/1/2024 by Dr. Dean lazo. Patient has been nonweightbearing right lower extremity using a knee scooter but was released by Dr. Dean Lazo (according to his note on 1/16/2024) to begin weightbearing in her boot. Patient reports she will be able to wean from the walking boot at 9 weeks post-op. Patient returns to Dr. Dean Lazo on 2/15/2024.      Falls: one resulting in presenting condition    Imaging: X-ray of the right ankle from 12/15/2023: "The previously identified fracture of the cuboid is poorly visualized on this limited exam. No joint abnormality. No radiopaque foreign bodies."     Prior Therapy: none for presenting condition  Social History: lives with their spouse  Steps/Stairs: 5 steps with bilateral handrails to enter home  Occupation:  of [employer]  Driving: Yes  Durable Medical Equipment: walking boot for right foot  Dominant Extremity: right  Affected Extremity: right  Prior Level of Function: independent with all functional mobility without " Per Tisha patient needs 2 days of clear liquid and split Suprep.    Anti nausea medication sent to pharmacy.   assistive device  Current Level of Function: same as prior level of function but with walking boot on right lower extremity    Pain:  Current 0/10, worst 5/10, best 0/10   Location: right anterior ankle  Description: Aching  Aggravating Factors: wearing walking boot  Easing Factors: rest and elevation    Pts goals: Patient wishes to regain full functional use of the right ankle.     Medical History:   Past Medical History:   Diagnosis Date    Anxiety      Surgical History:   Naila Galeas  has a past surgical history that includes Repair of tendon of lower extremity (Right, 12/1/2023).    Medications:   Naila has a current medication list which includes the following prescription(s): celecoxib, diclofenac, fluoxetine, fluoxetine, ondansetron, and oxycodone-acetaminophen.    Allergies:   Review of patient's allergies indicates:  No Known Allergies     Objective     Range of motion:   Right Left    Knee extension WFL WFL   Knee flexion WFL WFL   Ankle dorsiflexion -8* neutral   Ankle plantarflexion 42* 45*   Ankle inversion 25* 28*   Ankle eversion 10* 20*     Manual muscle testing:   Right  Left    Ankle dorsiflexion  MMT strength: 3-/5  MMT strength: 5/5   Ankle plantarflexion  MMT strength: 3-/5  MMT strength: 5/5   Ankle inversion  MMT strength: 3-/5  MMT strength: 5/5   Ankle eversion  MMT strength: 3-/5  MMT strength: 5/5     Incision: healing nicely with no signs of infection    Girth (bilateral malleoli): 27.5 cm right; 26 cm left    Gait:  Weight bearing precautions: WBAT  Assistive device: none; walking boot  Ambulation distance and deviations: short community distances with right walking boot; slight limp due to boot height  Stairs: up/down steps with a step-to pattern leading with the left lower extremity with bilateral upper extremity support    Limitation/Restriction for FOTO Intake Survey    Therapist reviewed FOTO scores for Naila Galeas on 1/26/2024.   FOTO documents entered into EPIC - see Media  "section.    Limitation Score: 55.2%       Patient Education and Home Exercises     Education provided: Patient educated on the importance of completing skilled physical therapy treatment and home exercise program as prescribed to maximize functional gains.     Written Home Exercises Provided: yes. Exercises were reviewed and Naila was able to demonstrate them prior to the end of the session. Naila demonstrated good understanding of the education provided.     See EMR under "Patient Instructions" for exercises provided during therapy sessions.    Assessment     Naila is a 33 y.o. female referred to outpatient physical therapy with a medical diagnosis of Z47.89 (ICD-10-CM) - Orthopedic aftercare. Pt presents to PT status post right peroneal brevis repair with decreased right ankle range of motion, right ankle weakness, and abnormal gait/stair negotiation mechanics.    Pt prognosis is Good.   Pt will benefit from skilled outpatient Physical Therapy to address the deficits stated above and in the chart below, provide pt/family education, and to maximize pt's level of independence.     Plan of care discussed with patient: Yes  Pt's spiritual, cultural and educational needs considered and pt agreeable to plan of care and goals as stated below:     Anticipated Barriers for therapy: compliance with home exercise program; natural course of healing     Medical Necessity is demonstrated by the following:  History  Co-morbidities and personal factors that may impact the plan of care [x] LOW: no personal factors / co-morbidities  [] MODERATE: 1-2 personal factors / co-morbidities  [] HIGH: 3+ personal factors / co-morbidities    Moderate / High Support Documentation:   Co-morbidities affecting plan of care: N/A    Personal Factors:   no deficits     Examination  Body Structures and Functions, activity limitations and participation restrictions that may impact the plan of care [] LOW: addressing 1-2 elements  [x] MODERATE: 3+ " elements  [] HIGH: 4+ elements (please support below)    Moderate / High Support Documentation: range of motion, strength, and gait/stair negotiation mechanics     Clinical Presentation [x] LOW: stable  [] MODERATE: Evolving  [] HIGH: Unstable     Decision Making/ Complexity Score: low       Goals:    Short Term Goals:  Patient will demonstrate independence with home exercise program to ensure carryover of treatment.  Patient will demonstrate neutral right ankle dorsiflexion range of motion josé miguel improve functional use of the right ankle.  Patient will demonstrate 20 degrees of right ankle eversion active range of motion to improve functional use of the right ankle.  Patient will improve right ankle strength to 3+/5 to improve independence and safety with bed mobility, transfers, and gait.  Patient will ambulate 150 feet without assistive device or walking boot with independence with normal gait mechanics to improve independence and safety with gait.   Patient will ascend/descend 5 steps reciprocally with bilateral upper extremity support to improve independence and safety with stair negotiation.     Long Term Goals:  Patient will demonstrate 5 degrees of right ankle dorsiflexion active range of motion to improve functional use of the right ankle.   Patient will improve right ankle strength to 4/5 to improve independence and safety with bed mobility, transfers, and gait.  Patient will ambulate 300 feet without assistive device or walking boot  with independence  with normal gait mechanics including up/down curbs and ramps to improve independence and safety with community ambulation.  Patient will ascend/descend 5 steps reciprocally without upper extremity support to improve independence and safety with stair negotiation.     Plan     Plan of care Certification: 1/26/2024 to 3/8/2024.    Outpatient Physical Therapy 2 times weekly for 6 weeks to include the following interventions: Electrical Stimulation  (IFC/premodulated IFC), Gait Training, Manual Therapy, Moist Heat/ Ice, Neuromuscular Re-ed, Patient Education, Therapeutic Activities, Therapeutic Exercise, and Ultrasound.       Benjamin Sánchez, PT, DPT  1/26/2024

## 2024-04-12 ENCOUNTER — DOCUMENTATION ONLY (OUTPATIENT)
Dept: REHABILITATION | Facility: HOSPITAL | Age: 34
End: 2024-04-12
Payer: COMMERCIAL

## 2024-04-12 PROBLEM — R29.898 ANKLE WEAKNESS: Status: RESOLVED | Noted: 2024-01-26 | Resolved: 2024-04-12

## 2024-04-12 PROBLEM — R26.9 ABNORMAL GAIT: Status: RESOLVED | Noted: 2024-01-26 | Resolved: 2024-04-12

## 2024-04-12 PROBLEM — Z47.89 ORTHOPEDIC AFTERCARE: Status: RESOLVED | Noted: 2023-12-15 | Resolved: 2024-04-12

## 2024-04-12 PROBLEM — M25.671 DECREASED RANGE OF MOTION OF RIGHT ANKLE: Status: RESOLVED | Noted: 2024-01-26 | Resolved: 2024-04-12

## 2024-04-12 NOTE — PROGRESS NOTES
[facility]  Physical Therapy Discharge Summary    Name: Naila Galeas  Clinic Number: [id number]    Therapy Diagnosis:        Encounter Diagnoses   Name Primary?    Orthopedic aftercare      Decreased range of motion of right ankle Yes    Abnormal gait      Ankle weakness        Physician: Dean Pang MD     Physician Orders: PT Eval and Treat  Medical Diagnosis from Referral: Z47.89 (ICD-10-CM) - Orthopedic aftercare  Evaluation Date: 1/26/2024    Date of Last visit: 2/16/2024  Total Visits Received: 6    Assessment      Goals:    Short Term Goals:  Patient will demonstrate independence with home exercise program to ensure carryover of treatment. [Met]  Patient will demonstrate neutral right ankle dorsiflexion range of motion josé miguel improve functional use of the right ankle. [Met]  Patient will demonstrate 20 degrees of right ankle eversion active range of motion to improve functional use of the right ankle. [Met]  Patient will improve right ankle strength to 3+/5 to improve independence and safety with bed mobility, transfers, and gait. [Met]  Patient will ambulate 150 feet without assistive device or walking boot with independence with normal gait mechanics to improve independence and safety with gait. [Met]  Patient will ascend/descend 5 steps reciprocally with bilateral upper extremity support to improve independence and safety with stair negotiation. [Met]     Long Term Goals:  Patient will demonstrate 5 degrees of right ankle dorsiflexion active range of motion to improve functional use of the right ankle. [Met]  Patient will improve right ankle strength to 4/5 to improve independence and safety with bed mobility, transfers, and gait. [Met]  Patient will ambulate 300 feet without assistive device or walking boot  with independence with normal gait mechanics including up/down curbs and ramps to improve independence and safety with community ambulation. [Met]  Patient will  ascend/descend 5 steps reciprocally without upper extremity support to improve independence and safety with stair negotiation. [Met]    Discharge reason: Patient has met all of his/her goals.    Plan     This patient is discharged from Physical Therapy.      Benjamin Sánchez, PT, DPT  4/12/2024

## 2024-09-23 DIAGNOSIS — M25.561 RIGHT KNEE PAIN, UNSPECIFIED CHRONICITY: Primary | ICD-10-CM

## 2024-09-24 ENCOUNTER — OFFICE VISIT (OUTPATIENT)
Dept: ORTHOPEDICS | Facility: CLINIC | Age: 34
End: 2024-09-24
Payer: COMMERCIAL

## 2024-09-24 ENCOUNTER — HOSPITAL ENCOUNTER (OUTPATIENT)
Dept: RADIOLOGY | Facility: HOSPITAL | Age: 34
Discharge: HOME | End: 2024-09-24
Attending: ORTHOPAEDIC SURGERY
Payer: COMMERCIAL

## 2024-09-24 VITALS — WEIGHT: 210 LBS | HEIGHT: 63 IN | BODY MASS INDEX: 37.21 KG/M2

## 2024-09-24 DIAGNOSIS — M25.561 ACUTE PAIN OF RIGHT KNEE: Primary | ICD-10-CM

## 2024-09-24 DIAGNOSIS — M25.561 RIGHT KNEE PAIN, UNSPECIFIED CHRONICITY: ICD-10-CM

## 2024-09-24 PROCEDURE — 73564 X-RAY EXAM KNEE 4 OR MORE: CPT | Mod: 26,RT,, | Performed by: ORTHOPAEDIC SURGERY

## 2024-09-24 PROCEDURE — 3008F BODY MASS INDEX DOCD: CPT | Mod: S$GLB,,, | Performed by: ORTHOPAEDIC SURGERY

## 2024-09-24 PROCEDURE — 73564 X-RAY EXAM KNEE 4 OR MORE: CPT | Mod: TC,RT

## 2024-09-24 PROCEDURE — 1159F MED LIST DOCD IN RCRD: CPT | Mod: S$GLB,,, | Performed by: ORTHOPAEDIC SURGERY

## 2024-09-24 PROCEDURE — 99213 OFFICE O/P EST LOW 20 MIN: CPT | Mod: S$GLB,,, | Performed by: ORTHOPAEDIC SURGERY

## 2024-09-24 PROCEDURE — 99999 PR PBB SHADOW E&M-EST. PATIENT-LVL III: CPT | Mod: PBBFAC,,, | Performed by: ORTHOPAEDIC SURGERY
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
## 2024-09-25 ENCOUNTER — HOSPITAL ENCOUNTER (OUTPATIENT)
Dept: RADIOLOGY | Facility: HOSPITAL | Age: 34
Discharge: HOME | End: 2024-09-25
Attending: ORTHOPAEDIC SURGERY
Payer: COMMERCIAL

## 2024-09-25 DIAGNOSIS — M25.561 ACUTE PAIN OF RIGHT KNEE: ICD-10-CM

## 2024-09-25 PROCEDURE — 73721 MRI JNT OF LWR EXTRE W/O DYE: CPT | Mod: 26,RT,, | Performed by: RADIOLOGY

## 2024-09-25 PROCEDURE — 73721 MRI JNT OF LWR EXTRE W/O DYE: CPT | Mod: TC,RT

## 2024-09-26 ENCOUNTER — TELEPHONE (OUTPATIENT)
Dept: ORTHOPEDICS | Facility: CLINIC | Age: 34
End: 2024-09-26
Payer: COMMERCIAL

## 2024-09-26 NOTE — PROGRESS NOTES
Has severe tendinitis and edema within the anterior interval should respond well to an injection.  We can schedule an injection in the coming days

## 2024-09-26 NOTE — TELEPHONE ENCOUNTER
----- Message from Dean Pang MD sent at 9/26/2024  8:11 AM CDT -----  Has severe tendinitis and edema within the anterior interval should respond well to an injection.  We can schedule an injection in the coming days

## 2024-10-30 ENCOUNTER — OFFICE VISIT (OUTPATIENT)
Dept: FAMILY MEDICINE | Facility: CLINIC | Age: 34
End: 2024-10-30
Payer: COMMERCIAL

## 2024-10-30 VITALS
DIASTOLIC BLOOD PRESSURE: 68 MMHG | TEMPERATURE: 98 F | BODY MASS INDEX: 36.29 KG/M2 | RESPIRATION RATE: 18 BRPM | HEIGHT: 63 IN | SYSTOLIC BLOOD PRESSURE: 104 MMHG | WEIGHT: 204.81 LBS | OXYGEN SATURATION: 98 % | HEART RATE: 66 BPM

## 2024-10-30 DIAGNOSIS — N92.6 IRREGULAR MENSTRUATION: ICD-10-CM

## 2024-10-30 DIAGNOSIS — L50.9 URTICARIA: Primary | ICD-10-CM

## 2024-10-30 DIAGNOSIS — F41.9 ANXIETY: Chronic | ICD-10-CM

## 2024-10-30 DIAGNOSIS — Z79.899 OTHER LONG TERM (CURRENT) DRUG THERAPY: ICD-10-CM

## 2024-10-30 DIAGNOSIS — Z11.59 NEED FOR HEPATITIS C SCREENING TEST: ICD-10-CM

## 2024-10-30 PROBLEM — R30.0 DYSURIA: Status: RESOLVED | Noted: 2022-11-15 | Resolved: 2024-10-30

## 2024-10-30 PROBLEM — M25.571 RIGHT ANKLE PAIN: Status: RESOLVED | Noted: 2023-12-01 | Resolved: 2024-10-30

## 2024-10-30 PROBLEM — R10.9 ABDOMINAL PAIN: Status: RESOLVED | Noted: 2022-11-15 | Resolved: 2024-10-30

## 2024-10-30 LAB
ANION GAP SERPL CALCULATED.3IONS-SCNC: 7 MMOL/L (ref 7–16)
BASOPHILS # BLD AUTO: 0.03 K/UL (ref 0–0.2)
BASOPHILS NFR BLD AUTO: 0.7 % (ref 0–1)
BUN SERPL-MCNC: 17 MG/DL (ref 7–18)
BUN/CREAT SERPL: 22 (ref 6–20)
CALCIUM SERPL-MCNC: 9.2 MG/DL (ref 8.5–10.1)
CHLORIDE SERPL-SCNC: 108 MMOL/L (ref 98–107)
CO2 SERPL-SCNC: 30 MMOL/L (ref 21–32)
CREAT SERPL-MCNC: 0.78 MG/DL (ref 0.55–1.02)
DIFFERENTIAL METHOD BLD: ABNORMAL
EGFR (NO RACE VARIABLE) (RUSH/TITUS): 104 ML/MIN/1.73M2
EOSINOPHIL # BLD AUTO: 0.18 K/UL (ref 0–0.5)
EOSINOPHIL NFR BLD AUTO: 4.4 % (ref 1–4)
ERYTHROCYTE [DISTWIDTH] IN BLOOD BY AUTOMATED COUNT: 13 % (ref 11.5–14.5)
GLUCOSE SERPL-MCNC: 94 MG/DL (ref 74–106)
HCT VFR BLD AUTO: 42.4 % (ref 38–47)
HCV AB SER QL: NORMAL
HGB BLD-MCNC: 13.6 G/DL (ref 12–16)
IMM GRANULOCYTES # BLD AUTO: 0.01 K/UL (ref 0–0.04)
IMM GRANULOCYTES NFR BLD: 0.2 % (ref 0–0.4)
LYMPHOCYTES # BLD AUTO: 1.06 K/UL (ref 1–4.8)
LYMPHOCYTES NFR BLD AUTO: 25.9 % (ref 27–41)
MCH RBC QN AUTO: 29.2 PG (ref 27–31)
MCHC RBC AUTO-ENTMCNC: 32.1 G/DL (ref 32–36)
MCV RBC AUTO: 91.2 FL (ref 80–96)
MONOCYTES # BLD AUTO: 0.18 K/UL (ref 0–0.8)
MONOCYTES NFR BLD AUTO: 4.4 % (ref 2–6)
MPC BLD CALC-MCNC: 13 FL (ref 9.4–12.4)
NEUTROPHILS # BLD AUTO: 2.64 K/UL (ref 1.8–7.7)
NEUTROPHILS NFR BLD AUTO: 64.4 % (ref 53–65)
NRBC # BLD AUTO: 0 X10E3/UL
NRBC, AUTO (.00): 0 %
PLATELET # BLD AUTO: 197 K/UL (ref 150–400)
POTASSIUM SERPL-SCNC: 3.6 MMOL/L (ref 3.5–5.1)
RBC # BLD AUTO: 4.65 M/UL (ref 4.2–5.4)
SODIUM SERPL-SCNC: 141 MMOL/L (ref 136–145)
T3FREE SERPL-MCNC: 2.91 PG/ML (ref 2.18–3.98)
T4 FREE SERPL-MCNC: 0.87 NG/DL (ref 0.76–1.46)
THYROPEROXIDASE AB SERPL-ACNC: 32 U/ML (ref 0–60)
TSH SERPL DL<=0.005 MIU/L-ACNC: 1.13 UIU/ML (ref 0.36–3.74)
WBC # BLD AUTO: 4.1 K/UL (ref 4.5–11)

## 2024-10-30 PROCEDURE — 85025 COMPLETE CBC W/AUTO DIFF WBC: CPT | Mod: ,,, | Performed by: CLINICAL MEDICAL LABORATORY

## 2024-10-30 PROCEDURE — 84443 ASSAY THYROID STIM HORMONE: CPT | Mod: ,,, | Performed by: CLINICAL MEDICAL LABORATORY

## 2024-10-30 PROCEDURE — 3074F SYST BP LT 130 MM HG: CPT | Mod: ,,, | Performed by: NURSE PRACTITIONER

## 2024-10-30 PROCEDURE — 3008F BODY MASS INDEX DOCD: CPT | Mod: ,,, | Performed by: NURSE PRACTITIONER

## 2024-10-30 PROCEDURE — 80048 BASIC METABOLIC PNL TOTAL CA: CPT | Mod: ,,, | Performed by: CLINICAL MEDICAL LABORATORY

## 2024-10-30 PROCEDURE — 1159F MED LIST DOCD IN RCRD: CPT | Mod: ,,, | Performed by: NURSE PRACTITIONER

## 2024-10-30 PROCEDURE — 86038 ANTINUCLEAR ANTIBODIES: CPT | Mod: ,,, | Performed by: CLINICAL MEDICAL LABORATORY

## 2024-10-30 PROCEDURE — 3078F DIAST BP <80 MM HG: CPT | Mod: ,,, | Performed by: NURSE PRACTITIONER

## 2024-10-30 PROCEDURE — 86376 MICROSOMAL ANTIBODY EACH: CPT | Mod: ,,, | Performed by: CLINICAL MEDICAL LABORATORY

## 2024-10-30 PROCEDURE — 84481 FREE ASSAY (FT-3): CPT | Mod: ,,, | Performed by: CLINICAL MEDICAL LABORATORY

## 2024-10-30 PROCEDURE — 84439 ASSAY OF FREE THYROXINE: CPT | Mod: ,,, | Performed by: CLINICAL MEDICAL LABORATORY

## 2024-10-30 PROCEDURE — 99213 OFFICE O/P EST LOW 20 MIN: CPT | Mod: ,,, | Performed by: NURSE PRACTITIONER

## 2024-10-30 PROCEDURE — 1160F RVW MEDS BY RX/DR IN RCRD: CPT | Mod: ,,, | Performed by: NURSE PRACTITIONER

## 2024-10-30 PROCEDURE — 86803 HEPATITIS C AB TEST: CPT | Mod: ,,, | Performed by: CLINICAL MEDICAL LABORATORY

## 2024-10-31 LAB — ANA SER QL: NEGATIVE

## 2024-11-03 ENCOUNTER — OFFICE VISIT (OUTPATIENT)
Dept: FAMILY MEDICINE | Facility: CLINIC | Age: 34
End: 2024-11-03
Payer: COMMERCIAL

## 2024-11-03 VITALS
RESPIRATION RATE: 18 BRPM | BODY MASS INDEX: 36.5 KG/M2 | HEART RATE: 76 BPM | SYSTOLIC BLOOD PRESSURE: 116 MMHG | HEIGHT: 63 IN | DIASTOLIC BLOOD PRESSURE: 80 MMHG | TEMPERATURE: 99 F | WEIGHT: 206 LBS | OXYGEN SATURATION: 98 %

## 2024-11-03 DIAGNOSIS — D72.819 LEUKOPENIA, UNSPECIFIED TYPE: Primary | ICD-10-CM

## 2024-11-03 DIAGNOSIS — L50.9 URTICARIA: ICD-10-CM

## 2024-11-03 DIAGNOSIS — H66.93 BILATERAL OTITIS MEDIA, UNSPECIFIED OTITIS MEDIA TYPE: Primary | ICD-10-CM

## 2024-11-03 DIAGNOSIS — R50.9 FEVER, UNSPECIFIED FEVER CAUSE: ICD-10-CM

## 2024-11-03 DIAGNOSIS — R05.1 ACUTE COUGH: ICD-10-CM

## 2024-11-03 LAB
CTP QC/QA: YES
MOLECULAR STREP A: NEGATIVE
POC MOLECULAR INFLUENZA A AGN: NEGATIVE
POC MOLECULAR INFLUENZA B AGN: NEGATIVE
SARS-COV-2 RDRP RESP QL NAA+PROBE: NEGATIVE

## 2024-11-03 PROCEDURE — 3008F BODY MASS INDEX DOCD: CPT | Mod: ,,, | Performed by: NURSE PRACTITIONER

## 2024-11-03 PROCEDURE — 3079F DIAST BP 80-89 MM HG: CPT | Mod: ,,, | Performed by: NURSE PRACTITIONER

## 2024-11-03 PROCEDURE — 3074F SYST BP LT 130 MM HG: CPT | Mod: ,,, | Performed by: NURSE PRACTITIONER

## 2024-11-03 PROCEDURE — 99051 MED SERV EVE/WKEND/HOLIDAY: CPT | Mod: ,,, | Performed by: NURSE PRACTITIONER

## 2024-11-03 PROCEDURE — 99214 OFFICE O/P EST MOD 30 MIN: CPT | Mod: ,,, | Performed by: NURSE PRACTITIONER

## 2024-11-03 PROCEDURE — 87635 SARS-COV-2 COVID-19 AMP PRB: CPT | Mod: QW,,, | Performed by: NURSE PRACTITIONER

## 2024-11-03 PROCEDURE — 87502 INFLUENZA DNA AMP PROBE: CPT | Mod: QW,,, | Performed by: NURSE PRACTITIONER

## 2024-11-03 PROCEDURE — 1159F MED LIST DOCD IN RCRD: CPT | Mod: ,,, | Performed by: NURSE PRACTITIONER

## 2024-11-03 PROCEDURE — 87651 STREP A DNA AMP PROBE: CPT | Mod: QW,,, | Performed by: NURSE PRACTITIONER

## 2024-11-03 RX ORDER — AZITHROMYCIN 250 MG/1
TABLET, FILM COATED ORAL
Qty: 6 TABLET | Refills: 0 | Status: SHIPPED | OUTPATIENT
Start: 2024-11-03 | End: 2024-11-08

## 2024-11-03 RX ORDER — GUAIFENESIN AND PHENYLEPHRINE HCL 385; 10 MG/1; MG/1
1 TABLET ORAL 4 TIMES DAILY PRN
Qty: 20 TABLET | Refills: 0 | Status: SHIPPED | OUTPATIENT
Start: 2024-11-03

## 2024-11-03 RX ORDER — PREDNISONE 20 MG/1
20 TABLET ORAL DAILY
Qty: 5 TABLET | Refills: 0 | Status: SHIPPED | OUTPATIENT
Start: 2024-11-03

## 2024-11-03 NOTE — LETTER
November 3, 2024      [facility]  [address]  [place] [address]  Phone: [phone]  Fax: [phone]       Patient: Naila Galeas   YOB: 1990  Date of Visit: 11/03/2024    To Whom It May Concern:    Bonilla Galeas  was at [facility] on 11/03/2024. The patient may return to work/school on 11/05/2024 with no restrictions. If you have any questions or concerns, or if I can be of further assistance, please do not hesitate to contact me.    Sincerely,    LAURO Stevenson

## 2024-11-03 NOTE — PROGRESS NOTES
Subjective:       Patient ID: Naila Galeas is a 34 y.o. female.    Chief Complaint: Fever and Otalgia (Pt. States ear pain started 11/1/24)    Fever and Otalgia (Pt. States ear pain started 11/1/24)      Fever   Associated symptoms include ear pain. Pertinent negatives include no abdominal pain, chest pain, congestion, coughing, headaches, nausea, rash, sore throat or vomiting. Her past medical history is not significant for immunocompromised state.   Otalgia   Pertinent negatives include no abdominal pain, coughing, headaches, neck pain, rash, sore throat or vomiting.     Review of Systems   Constitutional:  Positive for fever. Negative for appetite change and fatigue.   HENT:  Positive for ear pain. Negative for nasal congestion and sore throat.    Eyes:  Negative for pain, discharge and itching.   Respiratory:  Negative for cough and shortness of breath.    Cardiovascular:  Negative for chest pain and leg swelling.   Gastrointestinal:  Negative for abdominal pain, change in bowel habit, nausea and vomiting.   Musculoskeletal:  Negative for back pain, gait problem and neck pain.   Integumentary:  Negative for rash and wound.   Allergic/Immunologic: Negative for immunocompromised state.   Neurological:  Negative for dizziness, weakness and headaches.   All other systems reviewed and are negative.        Objective:      Physical Exam  Vitals and nursing note reviewed.   Constitutional:       General: She is not in acute distress.     Appearance: Normal appearance. She is not ill-appearing, toxic-appearing or diaphoretic.   HENT:      Head: Normocephalic.      Right Ear: Ear canal and external ear normal. A middle ear effusion is present.      Left Ear: Ear canal and external ear normal. A middle ear effusion is present.      Nose: Congestion present. No rhinorrhea.      Mouth/Throat:      Mouth: Mucous membranes are moist.      Pharynx: Posterior oropharyngeal erythema (injected) present. No oropharyngeal exudate.    Eyes:      General: No scleral icterus.        Right eye: No discharge.         Left eye: No discharge.      Extraocular Movements: Extraocular movements intact.      Conjunctiva/sclera: Conjunctivae normal.      Pupils: Pupils are equal, round, and reactive to light.   Cardiovascular:      Rate and Rhythm: Normal rate and regular rhythm.      Pulses: Normal pulses.      Heart sounds: Normal heart sounds. No murmur heard.  Pulmonary:      Effort: Pulmonary effort is normal. No respiratory distress.      Breath sounds: No wheezing, rhonchi or rales.      Comments: Coarse breath sounds with a congested cough  Musculoskeletal:         General: Normal range of motion.      Cervical back: Neck supple. No tenderness.   Lymphadenopathy:      Cervical: No cervical adenopathy.   Skin:     General: Skin is warm and dry.      Capillary Refill: Capillary refill takes less than 2 seconds.      Findings: No rash.   Neurological:      Mental Status: She is alert and oriented to person, place, and time.   Psychiatric:         Mood and Affect: Mood normal.         Behavior: Behavior normal.         Thought Content: Thought content normal.         Judgment: Judgment normal.            Assessment:       1. Fever, unspecified fever cause    2. Acute cough    3. Bilateral otitis media, unspecified otitis media type        Plan:   Fever, unspecified fever cause  -     X-Ray Chest PA And Lateral; Future; Expected date: 11/03/2024  -     POCT Strep A, Molecular  -     POCT Influenza A/B Molecular  -     POCT COVID-19 Rapid Screening    Acute cough  -     X-Ray Chest PA And Lateral; Future; Expected date: 11/03/2024  -     POCT Strep A, Molecular  -     POCT Influenza A/B Molecular  -     POCT COVID-19 Rapid Screening  -     phenylephrine-guaiFENesin (DECONEX IR)  mg Tab; Take 1 tablet by mouth 4 (four) times daily as needed (congestion).  Dispense: 20 tablet; Refill: 0    Bilateral otitis media, unspecified otitis media type  -      predniSONE (DELTASONE) 20 MG tablet; Take 1 tablet (20 mg total) by mouth once daily.  Dispense: 5 tablet; Refill: 0  -     phenylephrine-guaiFENesin (DECONEX IR)  mg Tab; Take 1 tablet by mouth 4 (four) times daily as needed (congestion).  Dispense: 20 tablet; Refill: 0  -     azithromycin (Z-JOSSIE) 250 MG tablet; Take 2 tablets by mouth on day 1; Take 1 tablet by mouth on days 2-5  Dispense: 6 tablet; Refill: 0           Risks, benefits, and side effects were discussed with the patient. All questions were answered to the fullest satisfaction of the patient, and pt verbalized understanding and agreement to treatment plan. Pt was to call with any new or worsening symptoms, or present to the ER

## 2024-11-05 ENCOUNTER — OFFICE VISIT (OUTPATIENT)
Dept: FAMILY MEDICINE | Facility: CLINIC | Age: 34
End: 2024-11-05
Payer: COMMERCIAL

## 2024-11-05 VITALS
DIASTOLIC BLOOD PRESSURE: 68 MMHG | HEART RATE: 120 BPM | WEIGHT: 198.5 LBS | HEIGHT: 63 IN | SYSTOLIC BLOOD PRESSURE: 118 MMHG | OXYGEN SATURATION: 97 % | TEMPERATURE: 99 F | BODY MASS INDEX: 35.17 KG/M2

## 2024-11-05 DIAGNOSIS — E86.0 DEHYDRATION: ICD-10-CM

## 2024-11-05 DIAGNOSIS — J06.9 BACTERIAL URI: Primary | ICD-10-CM

## 2024-11-05 DIAGNOSIS — B96.89 BACTERIAL URI: Primary | ICD-10-CM

## 2024-11-05 DIAGNOSIS — R11.2 NAUSEA AND VOMITING, UNSPECIFIED VOMITING TYPE: ICD-10-CM

## 2024-11-05 LAB
CTP QC/QA: YES
CTP QC/QA: YES
POC MOLECULAR INFLUENZA A AGN: NEGATIVE
POC MOLECULAR INFLUENZA B AGN: NEGATIVE
SARS-COV-2 RDRP RESP QL NAA+PROBE: NEGATIVE

## 2024-11-05 PROCEDURE — 1160F RVW MEDS BY RX/DR IN RCRD: CPT | Mod: ,,, | Performed by: NURSE PRACTITIONER

## 2024-11-05 PROCEDURE — 96375 TX/PRO/DX INJ NEW DRUG ADDON: CPT | Mod: ,,, | Performed by: NURSE PRACTITIONER

## 2024-11-05 PROCEDURE — 3074F SYST BP LT 130 MM HG: CPT | Mod: ,,, | Performed by: NURSE PRACTITIONER

## 2024-11-05 PROCEDURE — 87635 SARS-COV-2 COVID-19 AMP PRB: CPT | Mod: QW,,, | Performed by: NURSE PRACTITIONER

## 2024-11-05 PROCEDURE — 3078F DIAST BP <80 MM HG: CPT | Mod: ,,, | Performed by: NURSE PRACTITIONER

## 2024-11-05 PROCEDURE — 1159F MED LIST DOCD IN RCRD: CPT | Mod: ,,, | Performed by: NURSE PRACTITIONER

## 2024-11-05 PROCEDURE — 96374 THER/PROPH/DIAG INJ IV PUSH: CPT | Mod: ,,, | Performed by: NURSE PRACTITIONER

## 2024-11-05 PROCEDURE — 3008F BODY MASS INDEX DOCD: CPT | Mod: ,,, | Performed by: NURSE PRACTITIONER

## 2024-11-05 PROCEDURE — 87502 INFLUENZA DNA AMP PROBE: CPT | Mod: QW,,, | Performed by: NURSE PRACTITIONER

## 2024-11-05 PROCEDURE — 99213 OFFICE O/P EST LOW 20 MIN: CPT | Mod: 25,,, | Performed by: NURSE PRACTITIONER

## 2024-11-05 PROCEDURE — 96361 HYDRATE IV INFUSION ADD-ON: CPT | Mod: ,,, | Performed by: NURSE PRACTITIONER

## 2024-11-05 RX ORDER — ONDANSETRON 2 MG/ML
4 INJECTION INTRAMUSCULAR; INTRAVENOUS
Status: COMPLETED | OUTPATIENT
Start: 2024-11-05 | End: 2024-11-05

## 2024-11-05 RX ORDER — CEFTRIAXONE 1 G/1
1 INJECTION, POWDER, FOR SOLUTION INTRAMUSCULAR; INTRAVENOUS
Status: COMPLETED | OUTPATIENT
Start: 2024-11-05 | End: 2024-11-05

## 2024-11-05 RX ORDER — PROMETHAZINE HYDROCHLORIDE 25 MG/1
25 TABLET ORAL EVERY 4 HOURS
Qty: 10 TABLET | Refills: 0 | Status: SHIPPED | OUTPATIENT
Start: 2024-11-05

## 2024-11-05 RX ORDER — ONDANSETRON 4 MG/1
4 TABLET, ORALLY DISINTEGRATING ORAL EVERY 6 HOURS PRN
Qty: 24 TABLET | Refills: 0 | Status: SHIPPED | OUTPATIENT
Start: 2024-11-05

## 2024-11-05 RX ORDER — CEFTRIAXONE 1 G/1
1 INJECTION, POWDER, FOR SOLUTION INTRAMUSCULAR; INTRAVENOUS
Status: DISCONTINUED | OUTPATIENT
Start: 2024-11-05 | End: 2024-11-05

## 2024-11-05 RX ADMIN — ONDANSETRON 4 MG: 2 INJECTION INTRAMUSCULAR; INTRAVENOUS at 10:11

## 2024-11-05 RX ADMIN — CEFTRIAXONE 1 G: 1 INJECTION, POWDER, FOR SOLUTION INTRAMUSCULAR; INTRAVENOUS at 10:11

## 2024-11-05 NOTE — LETTER
November 5, 2024      [facility]  [address]  [place] [address]  Phone: [phone]       Patient: Naila Galeas   YOB: 1990  Date of Visit: 11/05/2024    To Whom It May Concern:    Bonilla Galeas  was at [facility] on 11/05/2024. The patient may return to work/school on 11/07/2024 with no restrictions. If you have any questions or concerns, or if I can be of further assistance, please do not hesitate to contact me.    Sincerely,    Davion Arceo LPN

## 2024-11-06 NOTE — PROGRESS NOTES
"Subjective:       Naila Galeas is a 34 y.o. female who presents for evaluation of symptoms of a URI. Symptoms include achiness, congestion, cough described as productive, headache described as dull, low grade fever, and nausea with vomiting. Onset of symptoms was 3 days ago, and has been unchanged since that time. Treatment to date: none.    Review of Systems  Pertinent items are noted in HPI.     Objective:      /68 (Patient Position: Sitting)   Pulse (!) 120   Temp 99.3 °F (37.4 °C)   Ht 5' 2.99" (1.6 m)   Wt 90 kg (198 lb 8 oz)   LMP 08/10/2024 (Exact Date)   SpO2 97%   BMI 35.17 kg/m²   General appearance: alert, appears stated age, and cooperative  Head: Normocephalic, without obvious abnormality, atraumatic  Eyes: conjunctivae/corneas clear. PERRL, EOM's intact. Fundi benign.  Ears: abnormal TM right ear - dull and abnormal TM left ear - dull  Nose: Nares normal. Septum midline. Mucosa normal. No drainage or sinus tenderness., mild congestion  Throat: abnormal findings: PND  Lungs: clear to auscultation bilaterally  Heart: regular rate and rhythm, S1, S2 normal, no murmur, click, rub or gallop  Extremities: extremities normal, atraumatic, no cyanosis or edema  Skin: Skin color, texture, turgor normal. No rashes or lesions  Lymph nodes: Cervical, supraclavicular, and axillary nodes normal.  Neurologic: Alert and oriented X 3, normal strength and tone. Normal symmetric reflexes. Normal coordination and gait     Assessment:      URI with nausea/vomiting     Plan:      Discussed diagnosis and treatment of URI.  Suggested symptomatic OTC remedies.  Nasal saline spray for congestion.  Follow up as needed.  Take medication prescribed by immediate care clinic. IV fluids administered in clinic. Tolerated well.    "

## 2025-01-06 ENCOUNTER — TELEPHONE (OUTPATIENT)
Dept: OBSTETRICS AND GYNECOLOGY | Facility: CLINIC | Age: 35
End: 2025-01-06
Payer: COMMERCIAL

## 2025-01-06 NOTE — TELEPHONE ENCOUNTER
----- Message from Rosita sent at 1/6/2025 11:41 AM CST -----  Regarding: sooner trina  Who Called: Naila Galeas    Caller is requesting a sooner appointment. Caller declined first available appointment listed below. Caller will not accept being placed on the waitlist and is requesting a message be sent to doctor.    When is the first available appointment?Jul   Options offered (Virtual Visit, Urgent Care): n/a  Symptoms:cycle       Preferred Method of Contact: Phone Call  Patient's Preferred Phone Number on File: [phone]   Best Call Back Number, if different:  Additional Information:

## 2025-01-28 ENCOUNTER — OFFICE VISIT (OUTPATIENT)
Dept: OBSTETRICS AND GYNECOLOGY | Facility: CLINIC | Age: 35
End: 2025-01-28
Payer: COMMERCIAL

## 2025-01-28 VITALS
HEART RATE: 74 BPM | WEIGHT: 204 LBS | DIASTOLIC BLOOD PRESSURE: 74 MMHG | SYSTOLIC BLOOD PRESSURE: 114 MMHG | HEIGHT: 60 IN | BODY MASS INDEX: 40.05 KG/M2

## 2025-01-28 DIAGNOSIS — N92.6 IRREGULAR MENSES: ICD-10-CM

## 2025-01-28 DIAGNOSIS — L65.9 HAIR LOSS: ICD-10-CM

## 2025-01-28 DIAGNOSIS — N93.9 ABNORMAL UTERINE BLEEDING: ICD-10-CM

## 2025-01-28 DIAGNOSIS — N96 HISTORY OF RECURRENT MISCARRIAGES: ICD-10-CM

## 2025-01-28 DIAGNOSIS — Z31.69 INFERTILITY COUNSELING: ICD-10-CM

## 2025-01-28 DIAGNOSIS — Z12.4 SCREENING FOR MALIGNANT NEOPLASM OF THE CERVIX: Primary | ICD-10-CM

## 2025-01-28 PROCEDURE — 3074F SYST BP LT 130 MM HG: CPT | Mod: ,,, | Performed by: OBSTETRICS & GYNECOLOGY

## 2025-01-28 PROCEDURE — 3008F BODY MASS INDEX DOCD: CPT | Mod: ,,, | Performed by: OBSTETRICS & GYNECOLOGY

## 2025-01-28 PROCEDURE — 3078F DIAST BP <80 MM HG: CPT | Mod: ,,, | Performed by: OBSTETRICS & GYNECOLOGY

## 2025-01-28 PROCEDURE — 3044F HG A1C LEVEL LT 7.0%: CPT | Mod: ,,, | Performed by: OBSTETRICS & GYNECOLOGY

## 2025-01-28 PROCEDURE — 1159F MED LIST DOCD IN RCRD: CPT | Mod: ,,, | Performed by: OBSTETRICS & GYNECOLOGY

## 2025-01-28 PROCEDURE — 99999 PR PBB SHADOW E&M-EST. PATIENT-LVL III: CPT | Mod: PBBFAC,,, | Performed by: OBSTETRICS & GYNECOLOGY

## 2025-01-28 PROCEDURE — 1160F RVW MEDS BY RX/DR IN RCRD: CPT | Mod: ,,, | Performed by: OBSTETRICS & GYNECOLOGY

## 2025-01-28 PROCEDURE — 88142 CYTOPATH C/V THIN LAYER: CPT | Mod: TC,GCY | Performed by: OBSTETRICS & GYNECOLOGY

## 2025-01-28 PROCEDURE — 99402 PREV MED CNSL INDIV APPRX 30: CPT | Mod: S$GLB,,, | Performed by: OBSTETRICS & GYNECOLOGY

## 2025-01-28 PROCEDURE — 87624 HPV HI-RISK TYP POOLED RSLT: CPT | Mod: ,,, | Performed by: CLINICAL MEDICAL LABORATORY

## 2025-01-28 RX ORDER — NORETHINDRONE 5 MG/1
5 TABLET ORAL 2 TIMES DAILY
Qty: 60 TABLET | Refills: 0 | Status: SHIPPED | OUTPATIENT
Start: 2025-01-28 | End: 2025-02-27

## 2025-01-28 NOTE — PROGRESS NOTES
Annual Gynecologic Exam    Assessment/Plan:  34 y.o.  presenting for her annual exam:    Problem List Items Addressed This Visit    None  Visit Diagnoses       Screening for malignant neoplasm of the cervix    -  Primary    Relevant Orders    ThinPrep Pap Test (Completed)    HPV DNA probe, amplified (Completed)    Irregular menses        Relevant Orders    POCT urine pregnancy    Abnormal uterine bleeding        Relevant Medications    norethindrone (AYGESTIN) 5 mg Tab    Other Relevant Orders    US Pelvis Complete Non OB    CBC Auto Differential (Completed)    Infertility counseling        Relevant Orders    TSH (Completed)    Hemoglobin A1C (Completed)    Miscellaneous Test, Sendout antimullerian hor    History of recurrent miscarriages        Relevant Orders    Lupus Anticoagulant Eval w/Reflex (Completed)    High Spec Antiphospholipid Ab (Cardiolipin) IgG, IgM (Completed)    Beta-2 Glycoprotein I Ab (IgG/IgM) (Completed)    Hair loss        Relevant Orders    TSH (Completed)    BMI 39.0-39.9,adult        Relevant Orders    Hemoglobin A1C (Completed)          Discussed that this currently is AUB due to constant bleeding x 6 weeks with only a few days break. For this, Aygestin given to stop current bleeding.  CBC and TSH ordered.   For infertility w/u and obesity, HA1C ordered. AMH ordered as well.  PAP performed.  For recurrent miscarriages, thrombophilia w/u performed.     RTC in 2 weeks for pelvic US and return GYN visit.     Plan to start cyclic progesterone at that time. Then do day 3 and day 21 labs.   Also discuss Myoinositol supplement for possible PCOS.     Semen analysis order for .    Once these have resulted, then discuss further plan of care for fertility management. All questions answered.   Plan d/w patient and . All questions answered and they verbalized their understanding.    > 45 minutes spent on review of previous records, direct face to face counseling, and physical exam.  "    Health Maintenance:    Birth control: None. TTC.  Pregnancy plans: Desires   Safe relationship: Yes  PCP: ?     Screening:  Last pap smear: "a few years ago" Last one in the chart was .  History of abnormal pap smears: Denies      CC:   Chief Complaint   Patient presents with    Gynecologic Exam     Pt states she is losing hair, unable to lose weight, has been experiencing heavy bleeding with clots for a month which has gradually gotten worse over time. She has been trying to concieve but unable to due to cycles being irregular normally.         HPI:  34 y.o.  presents for her gynecologic annual exam. For the last six months, patient states that her menses have become incredibly irregular. She reports passing several large clots and bleeding off and on for the last six weeks. Patient admits to moderate to severe lower pelvic pain accompanying her menses. On Friday, she noted increase in flow with associated hair loss and mood changes. She does admit to history of anxiety which is treated with fluoxetine x 6 years. Patient reports loss of libido for several months. Patient does not feel she had sexual dysfunction until recently. Patient states she and her  have been trying to get pregnant for the last three years. She does have a 7-year old boy with her previous  which was delivered by . In 2021 and summer 2024, patient had two miscarriages both before the end of the first trimester. No previous infertility workup. Both the miscarriages were with her current partner.    Previous cystic ovaries as a teenager. Patient has trialed several different contraceptive methods including a Mirena IUD. This was removed in  and she started trying to get pregnant just after.     Patient reports chronic nausea for several years. This is mitigated by promethazine and ondansetron. She feels this may be related to her anxiety. Patient denies heart burn.     Last pap smear within the " last few years by Olivia Erickson CNM at [place].     In 2024 her primary care (Anila Naqvi) ruth NEVIN which was negative, TFTs which were all wnl including TPO, CBC and BMP were wnl at that time as well.     Prior to this month, she had a history of irregular menses every other month.       Review of Systems: The following ROS was otherwise negative, except as noted in the HPI:  constitutional, HEENT, respiratory, cardiovascular, gastrointestinal, genitourinary, skin, musculoskeletal, neurological, psych    Primary Care Physician: No, Primary Doctor    Obstetric History  OB History    Para Term  AB Living   3 1 1   2 1   SAB IAB Ectopic Multiple Live Births   2       1      # Outcome Date GA Lbr Urban/2nd Weight Sex Type Anes PTL Lv   3 SAB            2 SAB            1 Term      CS-Classical   MORTEZA       Gynecologic History       Social History     Substance and Sexual Activity   Sexual Activity Yes    Partners: Male    Birth control/protection: None                   Menstrual History:   LMP: on and off x 6 weeks.    Age of Menarche: 15   Menstrual Period: irregular   Interval Between Menses: weeks to months   Duration of Menses: 5 days to 6 weeks    Menstrual Flow: light to heavy   Bleeding between menses: Yes    Sexual History:    Contraception: see above   Currently is sexually active   Denies history of STIs   Denies sexual problems    Pap History:   History of abnormal pap smears: see above   Last pap: see above    Medical History:  Past Medical History:   Diagnosis Date    Anxiety        Medications:  Medication List with Changes/Refills   New Medications    NORETHINDRONE (AYGESTIN) 5 MG TAB    Take 1 tablet (5 mg total) by mouth 2 (two) times a day.   Current Medications    FLUOXETINE 40 MG CAPSULE    Take 40 mg by mouth once daily.    ONDANSETRON (ZOFRAN-ODT) 4 MG TBDL    Take 1 tablet (4 mg total) by mouth every 6 (six) hours as needed (nausea).    PHENYLEPHRINE-GUAIFENESIN  (DECONEX IR)  MG TAB    Take 1 tablet by mouth 4 (four) times daily as needed (congestion).    PREDNISONE (DELTASONE) 20 MG TABLET    Take 1 tablet (20 mg total) by mouth once daily.    PROMETHAZINE (PHENERGAN) 25 MG TABLET    Take 1 tablet (25 mg total) by mouth every 4 (four) hours.       Surgical History:  Past Surgical History:   Procedure Laterality Date     SECTION      REDUCTION OF BOTH BREASTS      REPAIR OF TENDON OF LOWER EXTREMITY Right 2023    Procedure: REPAIR, TENDON, LOWER EXTREMITY;  Surgeon: Dean Pang MD;  Location: [facility];  Service: Orthopedics;  Laterality: Right;  ANKLE/FOOT       Allergies:  Review of patient's allergies indicates:  No Known Allergies    Family History:  No family history on file.    Social History:  Social History     Socioeconomic History    Marital status:    Tobacco Use    Smoking status: Every Day     Types: Cigarettes    Smokeless tobacco: Never   Substance and Sexual Activity    Alcohol use: Never    Drug use: Never    Sexual activity: Yes     Partners: Male     Birth control/protection: None   Social History Narrative    ** Merged History Encounter **            Objective:  Body mass index is 39.84 kg/m².    /74 (BP Location: Left arm, Patient Position: Sitting)   Pulse 74   Ht 5' (1.524 m)   Wt 92.5 kg (204 lb)   LMP  (LMP Unknown)   BMI 39.84 kg/m²     General: Alert, well appearing, no acute distress  Head: Normocephalic, atraumatic  Lungs: Unlabored respirations. Clear to auscultation bilaterally.  Cardiovascular: Regular rate and rhythm.   Abdomen: Soft, nontender, nondistended   Pelvic: Exam chaperoned by female assistant.    External: normal female genitalia, no masses or lesions   Vagina: moist, pink mucosa with rugae, moderate amount of old blood. Slow oozing from the cervix.  Cervix: no masses or lesions, nontender. PAP obtained.  Uterus: approx 8 weeks, mobile, midline, nontender  Adnexa: no masses or  fullness, nontender  Rectovaginal: deferred  Extremities: No redness or tenderness  Skin: Well perfused, normal coloration and turgor, no lesions or rashes visualized  Neuro: Alert, oriented, normal speech, no focal deficits, moves extremities appropriately  Psych: Normal mood and behavior.     Marisol Mcleod MD     Answers submitted by the patient for this visit:  Gynecologic Exam Questionnaire  (Submitted on 2025)  Chief Complaint: Gynecologic exam  vaginal bleeding: Yes  Chronicity: recurrent  Onset: more than 1 year ago  Frequency: intermittently  Progression since onset: gradually worsening  Pain severity: severe  Affected side: both  Pregnant now?: No  abdominal pain: No  anorexia: No  back pain: No  chills: No  constipation: No  diarrhea: No  discolored urine: No  dysuria: No  fever: No  flank pain: No  frequency: No  headaches: No  hematuria: No  nausea: No  painful intercourse: No  rash: No  urgency: No  vomiting: No  Please select the characteristics of your discharge: : bloody  Vaginal bleeding: heavier than menses  Passing clots?: Yes  Passing tissue?: No  Aggravated by: activity, heavy lifting  treatments tried: acetaminophen, heating pad  Improvement on treatment: no relief  Sexual activity: sexually active  Partner with STD symptoms: no  Birth control: nothing  Menstrual history: irregular  STD: No  abdominal surgery: No   section: Yes  Ectopic pregnancy: No  Endometriosis: No  herpes simplex: No  gynecological surgery: No  menorrhagia: No  metrorrhagia: No  miscarriage: Yes  ovarian cysts: Yes  perineal abscess: No  PID: No  terminated pregnancy: No  vaginosis: No

## 2025-01-31 LAB
GH SERPL-MCNC: NORMAL NG/ML
INSULIN SERPL-ACNC: NORMAL U[IU]/ML
LAB AP CLINICAL INFORMATION: NORMAL
LAB AP GYN INTERPRETATION: NEGATIVE
LAB AP PAP DISCLAIMER COMMENTS: NORMAL
RENIN PLAS-CCNC: NORMAL NG/ML/H

## 2025-02-01 LAB
HPV 16: NEGATIVE
HPV 18: NEGATIVE
HPV OTHER: NEGATIVE

## 2025-02-11 ENCOUNTER — OFFICE VISIT (OUTPATIENT)
Dept: OBSTETRICS AND GYNECOLOGY | Facility: CLINIC | Age: 35
End: 2025-02-11
Attending: OBSTETRICS & GYNECOLOGY
Payer: COMMERCIAL

## 2025-02-11 ENCOUNTER — HOSPITAL ENCOUNTER (OUTPATIENT)
Dept: RADIOLOGY | Facility: HOSPITAL | Age: 35
Discharge: HOME | End: 2025-02-11
Attending: OBSTETRICS & GYNECOLOGY
Payer: COMMERCIAL

## 2025-02-11 VITALS
SYSTOLIC BLOOD PRESSURE: 125 MMHG | HEART RATE: 65 BPM | HEIGHT: 60 IN | BODY MASS INDEX: 39.85 KG/M2 | WEIGHT: 203 LBS | DIASTOLIC BLOOD PRESSURE: 101 MMHG

## 2025-02-11 DIAGNOSIS — N93.9 ABNORMAL UTERINE BLEEDING: ICD-10-CM

## 2025-02-11 DIAGNOSIS — N93.9 ABNORMAL UTERINE BLEEDING (AUB): ICD-10-CM

## 2025-02-11 DIAGNOSIS — E28.2 PCO (POLYCYSTIC OVARIES): Primary | ICD-10-CM

## 2025-02-11 DIAGNOSIS — N96 HISTORY OF RECURRENT MISCARRIAGES: ICD-10-CM

## 2025-02-11 DIAGNOSIS — Z31.69 INFERTILITY COUNSELING: ICD-10-CM

## 2025-02-11 PROCEDURE — 76830 TRANSVAGINAL US NON-OB: CPT | Mod: 26,,, | Performed by: RADIOLOGY

## 2025-02-11 PROCEDURE — 99213 OFFICE O/P EST LOW 20 MIN: CPT | Mod: S$GLB,,, | Performed by: OBSTETRICS & GYNECOLOGY

## 2025-02-11 PROCEDURE — 1159F MED LIST DOCD IN RCRD: CPT | Mod: ,,, | Performed by: OBSTETRICS & GYNECOLOGY

## 2025-02-11 PROCEDURE — 3008F BODY MASS INDEX DOCD: CPT | Mod: ,,, | Performed by: OBSTETRICS & GYNECOLOGY

## 2025-02-11 PROCEDURE — 76830 TRANSVAGINAL US NON-OB: CPT | Mod: TC

## 2025-02-11 PROCEDURE — 76856 US EXAM PELVIC COMPLETE: CPT | Mod: 26,,, | Performed by: RADIOLOGY

## 2025-02-11 PROCEDURE — 1160F RVW MEDS BY RX/DR IN RCRD: CPT | Mod: ,,, | Performed by: OBSTETRICS & GYNECOLOGY

## 2025-02-11 PROCEDURE — 3074F SYST BP LT 130 MM HG: CPT | Mod: ,,, | Performed by: OBSTETRICS & GYNECOLOGY

## 2025-02-11 PROCEDURE — 99999 PR PBB SHADOW E&M-EST. PATIENT-LVL III: CPT | Mod: PBBFAC,,, | Performed by: OBSTETRICS & GYNECOLOGY

## 2025-02-11 PROCEDURE — 3080F DIAST BP >= 90 MM HG: CPT | Mod: ,,, | Performed by: OBSTETRICS & GYNECOLOGY

## 2025-02-11 PROCEDURE — 3044F HG A1C LEVEL LT 7.0%: CPT | Mod: ,,, | Performed by: OBSTETRICS & GYNECOLOGY

## 2025-02-11 NOTE — PROGRESS NOTES
Return Gyn Office Visit    Assessment/Plan  Problem List Items Addressed This Visit    None  Visit Diagnoses         PCO (polycystic ovaries)    -  Primary      Abnormal uterine bleeding (AUB)          Infertility counseling          History of recurrent miscarriages              Discussed PCOS.   Recommended continuing the Aygestin for another 2 weeks or so. Then stop it. Then will plan to do Provera x 10 days to induce menses.   Then plan day 3 labs: FSH, estradiol.   Then plan day 21 labs--progesterone.     Nutrition consult. Information for Jodi provided.   Myoinositol 4 g daily recommended. (She cannot tolerate Metformin.)    Discussed eating a high protein low carb diet and exercise.  Recommended semen analysis for . After this has resulted, then can plan ovulation induction if desired.     F/u in 2 weeks for virtual visit to f/u on symptoms.      CC:   Chief Complaint   Patient presents with    Follow-up     HPI:  35 y.o. who presents to office for US discussion.    FINDINGS:  The uterus measures 9.5 x 5.4 x 4.0 cm.  No focal uterine lesions are seen.  The endometrium measures 6.4 mm.     The right ovary measures 2.7 x 2.3 x 3.0 cm.  The left ovary measures 2.8 x 2.3 x 3.3 cm.  No fluid is present in the cul-de-sac.     Impression:     There is mild uterine enlargement but no focal lesions are seen in the uterus or ovaries.  (Per personal review, ovaries appear polycystic.)    Labs reviewed and wnl:    AMH 11 (likely due to PCOS)  CBC wnl, ACL, Beta2, LAC all negative. TSH wnl, A1C wnl,     Patient states the norethindrone stopped her vaginal bleeding; however, when discontinued, her menses resumed immediately. Prior to onset of heavy menses, patient had irregular menses for several years. Her cycles seemed to regulate if she was taking birth control. Patient has previously taken metformin which she did not tolerate well.     Review of Systems - The following ROS was otherwise negative, except as  noted in the HPI:  constitutional, respiratory, cardiovascular, gastrointestinal, genitourinary    Objective:  BP (!) 125/101 (BP Location: Left arm, Patient Position: Sitting)   Pulse 65   Ht 5' (1.524 m)   Wt 92.1 kg (203 lb)   LMP 2025   BMI 39.65 kg/m²   General: Alert, well appearing, no acute distress  Psych: Normal mood and behavior.      Marisol Mcleod MD     Answers submitted by the patient for this visit:  Gynecologic Exam Questionnaire  (Submitted on 2/10/2025)  Chief Complaint: Gynecologic exam  genital itching: No  genital lesions: No  genital odor: No  genital rash: No  missed menses: No  pelvic pain: No  vaginal bleeding: Yes  vaginal discharge: No  Chronicity: recurrent  Onset: more than 1 month ago  Frequency: daily  Progression since onset: gradually worsening  Pain severity: mild  Affected side: both  Pregnant now?: No  abdominal pain: No  anorexia: No  back pain: No  chills: No  constipation: No  diarrhea: No  discolored urine: No  dysuria: No  fever: No  flank pain: No  frequency: No  headaches: No  hematuria: No  nausea: No  painful intercourse: No  rash: No  urgency: No  vomiting: No  Please select the characteristics of your discharge: : normal  Vaginal bleeding: lighter than menses  Passing clots?: Yes  Passing tissue?: No  Aggravated by: activity  treatments tried: acetaminophen  Improvement on treatment: mild  Sexual activity: sexually active  Partner with STD symptoms: no  Birth control: nothing  Menstrual history: irregular  STD: No  abdominal surgery: No   section: Yes  Ectopic pregnancy: No  Endometriosis: No  herpes simplex: No  gynecological surgery: No  menorrhagia: Yes  metrorrhagia: Yes  miscarriage: Yes  ovarian cysts: Yes  perineal abscess: No  PID: No  terminated pregnancy: No  vaginosis: No

## 2025-02-25 ENCOUNTER — OFFICE VISIT (OUTPATIENT)
Dept: OBSTETRICS AND GYNECOLOGY | Facility: CLINIC | Age: 35
End: 2025-02-25
Payer: COMMERCIAL

## 2025-02-25 DIAGNOSIS — E28.2 PCO (POLYCYSTIC OVARIES): Primary | ICD-10-CM

## 2025-02-25 DIAGNOSIS — N93.9 ABNORMAL UTERINE BLEEDING (AUB): ICD-10-CM

## 2025-02-25 PROCEDURE — 99499 UNLISTED E&M SERVICE: CPT | Mod: 93,,, | Performed by: OBSTETRICS & GYNECOLOGY

## 2025-02-25 NOTE — PROGRESS NOTES
Audio Only Telehealth Visit     Called the patient. She did not answer. LMOM to call back to r/s virtual visit.                        This service was not originating from a related E/M service provided within the previous 7 days nor will  to an E/M service or procedure within the next 24 hours or my soonest available appointment.  Prevailing standard of care was able to be met in this audio-only visit.

## 2025-03-20 ENCOUNTER — OFFICE VISIT (OUTPATIENT)
Dept: OBSTETRICS AND GYNECOLOGY | Facility: CLINIC | Age: 35
End: 2025-03-20
Payer: COMMERCIAL

## 2025-03-20 DIAGNOSIS — R10.2 CHRONIC PELVIC PAIN IN FEMALE: Primary | ICD-10-CM

## 2025-03-20 DIAGNOSIS — F41.9 ANXIETY: ICD-10-CM

## 2025-03-20 DIAGNOSIS — E28.2 PCOS (POLYCYSTIC OVARIAN SYNDROME): ICD-10-CM

## 2025-03-20 DIAGNOSIS — G89.29 CHRONIC PELVIC PAIN IN FEMALE: Primary | ICD-10-CM

## 2025-03-20 DIAGNOSIS — Z31.69 INFERTILITY COUNSELING: ICD-10-CM

## 2025-03-20 RX ORDER — HYDROXYZINE HYDROCHLORIDE 25 MG/1
25 TABLET, FILM COATED ORAL 4 TIMES DAILY PRN
Qty: 60 TABLET | Refills: 3 | Status: SHIPPED | OUTPATIENT
Start: 2025-03-20

## 2025-03-20 RX ORDER — MEDROXYPROGESTERONE ACETATE 5 MG/1
5 TABLET ORAL DAILY
Qty: 10 TABLET | Refills: 0 | Status: SHIPPED | OUTPATIENT
Start: 2025-03-20 | End: 2025-03-30

## 2025-03-20 NOTE — PROGRESS NOTES
Audio Only Telehealth Visit     The patient location is: home.  The chief complaint leading to consultation is: AUB, PCOS, cramping.  Visit type: Virtual visit with audio only (telephone)  Total time spent in medical discussion with patient: 10 minutes  Total time spent on date of the encounter: 20 minutes       The reason for the audio only service rather than synchronous audio and video virtual visit was related to technical difficulties or patient preference/necessity.       Each patient to whom I provide medical services by telemedicine is:  (1) informed of the relationship between the physician and patient and the respective role of any other health care provider with respect to management of the patient; and (2) notified that they may decline to receive medical services by telemedicine and may withdraw from such care at any time. Patient verbally consented to receive this service via voice-only telephone call.       HPI: 34 yo  is having this visit to follow up on her AUB, PCOS, pelvic pain, and fertility.  Reviewed her labs thus far were wnl.  Her VB finally stopped. She stopped the Aygestin 1 week ago and has had no further bleeding since there.   She is still having cramping and pelvic pain. This has been a chronic issue for her.     She is also c/o anxiety attacks that have been worsening recently. She is on Prozac. She has not seen her counselor in years. She is having anger issues when this happens as well.      Assessment and plan:      Chronic pelvic pain.  PCOS  Anxiety  Infertility.    Recommended to start Medroxyprogesterone x 10 days if no spontaneous menses.  Message when menses starts.  Do labs on Day 3 of cycle: FSH, estradiol, LH.  Start Myoinositol supplement OTC 4 g daily.  Referral to pelvic floor PT for chronic pelvic pain.  Rx for Vistaril for anxiety. Encouraged to call and make appointment with counselor. No SI or HI.   Smoking cessation encouraged.     F/u when labs resulted to  discuss further plan of care.         This service was not originating from a related E/M service provided within the previous 7 days nor will  to an E/M service or procedure within the next 24 hours or my soonest available appointment.  Prevailing standard of care was able to be met in this audio-only visit.

## 2025-03-20 NOTE — PATIENT INSTRUCTIONS
Recommended to start Medroxyprogesterone x 10 days if no spontaneous menses.  Message when menses starts.  Do labs on Day 3 of cycle: FSH, estradiol, LH.  Start Myoinositol supplement OTC 4 g daily.  Referral to pelvic floor PT for chronic pelvic pain.  Rx for Vistaril for anxiety. Encouraged to call and make appointment with counselor. No SI or HI.   Smoking cessation encouraged.     F/u when labs resulted to discuss further plan of care.

## 2025-04-28 ENCOUNTER — PATIENT MESSAGE (OUTPATIENT)
Dept: OBSTETRICS AND GYNECOLOGY | Facility: CLINIC | Age: 35
End: 2025-04-28
Payer: COMMERCIAL

## 2025-05-06 ENCOUNTER — RESULTS FOLLOW-UP (OUTPATIENT)
Dept: OBSTETRICS AND GYNECOLOGY | Facility: CLINIC | Age: 35
End: 2025-05-06

## 2025-05-06 ENCOUNTER — PATIENT MESSAGE (OUTPATIENT)
Dept: OBSTETRICS AND GYNECOLOGY | Facility: CLINIC | Age: 35
End: 2025-05-06
Payer: COMMERCIAL

## 2025-05-06 DIAGNOSIS — Z31.69 INFERTILITY COUNSELING: Primary | ICD-10-CM

## 2025-05-21 ENCOUNTER — PATIENT MESSAGE (OUTPATIENT)
Dept: OBSTETRICS AND GYNECOLOGY | Facility: CLINIC | Age: 35
End: 2025-05-21
Payer: COMMERCIAL

## 2025-05-21 ENCOUNTER — RESULTS FOLLOW-UP (OUTPATIENT)
Dept: OBSTETRICS AND GYNECOLOGY | Facility: CLINIC | Age: 35
End: 2025-05-21

## 2025-08-26 ENCOUNTER — PATIENT MESSAGE (OUTPATIENT)
Dept: OBSTETRICS AND GYNECOLOGY | Facility: CLINIC | Age: 35
End: 2025-08-26
Payer: COMMERCIAL

## 2025-08-27 DIAGNOSIS — N93.9 ABNORMAL UTERINE BLEEDING: Primary | ICD-10-CM

## 2025-08-27 DIAGNOSIS — N93.9 ABNORMAL UTERINE BLEEDING (AUB): Primary | ICD-10-CM

## 2025-08-27 RX ORDER — NORETHINDRONE 5 MG/1
5 TABLET ORAL 2 TIMES DAILY
Qty: 14 TABLET | Refills: 0 | Status: SHIPPED | OUTPATIENT
Start: 2025-08-27 | End: 2025-09-03

## (undated) DEVICE — Device

## (undated) DEVICE — GOWN POLY REINF BRTH SLV XL

## (undated) DEVICE — BANDAGE ESMARK 6INX3YD

## (undated) DEVICE — DRAPE U SPLIT SHEET 54X76IN

## (undated) DEVICE — GLOVE 6.5 PROTEXIS PI BLUE

## (undated) DEVICE — BANDAGE COHES LTX TAN 4INX5YD

## (undated) DEVICE — DRESSING XEROFORM NONADH 1X8IN

## (undated) DEVICE — TOURNIQUET SB QC SP 34X4IN

## (undated) DEVICE — PAD CAST SPECIALIST STRL 3

## (undated) DEVICE — SUT FIBERWIRE 0 38 W/NDL

## (undated) DEVICE — SOL NACL IRR 1000ML BTL

## (undated) DEVICE — SUT VCRL + COAT 2 NDL 2-0 27IN

## (undated) DEVICE — GLOVE 8 PROTEXIS PI BLUE

## (undated) DEVICE — SUT 2-0 VICRYL / CT-1

## (undated) DEVICE — DRAPE INVISISHIELD U 48X52IN

## (undated) DEVICE — GLOVE BIOGEL SKINSENSE PI 8.0

## (undated) DEVICE — SPONGE COTTON TRAY 4X4IN

## (undated) DEVICE — GOWN NONREINF SET-IN SLV 2XL

## (undated) DEVICE — SUT MONOCRYL 3-0 PS-2 UND

## (undated) DEVICE — GLOVE BIOGEL SKINSENSE PI 7.5

## (undated) DEVICE — CAST LARGE OR FROM CAST CART

## (undated) DEVICE — GLOVE BIOGEL SKINSENSE PI 7.0

## (undated) DEVICE — SUT ETHILON 4-0 PS2 18 BLK

## (undated) DEVICE — GLOVE 7.0 PROTEXIS PI BLUE

## (undated) DEVICE — APPLICATOR CHLORAPREP ORN 26ML